# Patient Record
Sex: FEMALE | Race: WHITE | NOT HISPANIC OR LATINO | Employment: OTHER | URBAN - METROPOLITAN AREA
[De-identification: names, ages, dates, MRNs, and addresses within clinical notes are randomized per-mention and may not be internally consistent; named-entity substitution may affect disease eponyms.]

---

## 2023-10-26 ENCOUNTER — MEDICAL CORRESPONDENCE (OUTPATIENT)
Dept: HEALTH INFORMATION MANAGEMENT | Facility: CLINIC | Age: 88
End: 2023-10-26

## 2023-12-12 ENCOUNTER — TRANSFERRED RECORDS (OUTPATIENT)
Dept: HEALTH INFORMATION MANAGEMENT | Facility: CLINIC | Age: 88
End: 2023-12-12
Payer: MEDICARE

## 2023-12-21 ENCOUNTER — HOSPITAL ENCOUNTER (OUTPATIENT)
Dept: CT IMAGING | Facility: CLINIC | Age: 88
Discharge: HOME | End: 2023-12-21
Attending: PHYSICIAN ASSISTANT | Admitting: PHYSICIAN ASSISTANT
Payer: MEDICARE

## 2023-12-21 ENCOUNTER — OFFICE VISIT (OUTPATIENT)
Dept: FAMILY MEDICINE | Facility: CLINIC | Age: 88
End: 2023-12-21
Payer: MEDICARE

## 2023-12-21 VITALS
OXYGEN SATURATION: 98 % | BODY MASS INDEX: 18.99 KG/M2 | WEIGHT: 114 LBS | SYSTOLIC BLOOD PRESSURE: 118 MMHG | RESPIRATION RATE: 16 BRPM | TEMPERATURE: 98.4 F | HEART RATE: 60 BPM | DIASTOLIC BLOOD PRESSURE: 62 MMHG | HEIGHT: 65 IN

## 2023-12-21 DIAGNOSIS — D64.9 LOW HEMOGLOBIN: ICD-10-CM

## 2023-12-21 DIAGNOSIS — K92.1 BLACK STOOLS: ICD-10-CM

## 2023-12-21 DIAGNOSIS — R19.7 DIARRHEA, UNSPECIFIED TYPE: ICD-10-CM

## 2023-12-21 DIAGNOSIS — R30.0 DYSURIA: Primary | ICD-10-CM

## 2023-12-21 LAB
ALBUMIN SERPL BCG-MCNC: 3.7 G/DL (ref 3.5–5.2)
ALP SERPL-CCNC: 123 U/L (ref 40–150)
ALT SERPL W P-5'-P-CCNC: 7 U/L (ref 0–50)
ANION GAP SERPL CALCULATED.3IONS-SCNC: 8 MMOL/L (ref 7–15)
AST SERPL W P-5'-P-CCNC: 19 U/L (ref 0–45)
BILIRUB SERPL-MCNC: 0.4 MG/DL
BUN SERPL-MCNC: 14.7 MG/DL (ref 8–23)
CALCIUM SERPL-MCNC: 10 MG/DL (ref 8.8–10.2)
CHLORIDE SERPL-SCNC: 102 MMOL/L (ref 98–107)
CREAT SERPL-MCNC: 0.69 MG/DL (ref 0.51–0.95)
DEPRECATED HCO3 PLAS-SCNC: 28 MMOL/L (ref 22–29)
EGFRCR SERPLBLD CKD-EPI 2021: 83 ML/MIN/1.73M2
ERYTHROCYTE [DISTWIDTH] IN BLOOD BY AUTOMATED COUNT: 16.1 % (ref 10–15)
GLUCOSE SERPL-MCNC: 87 MG/DL (ref 70–99)
HCT VFR BLD AUTO: 33.9 % (ref 35–47)
HGB BLD-MCNC: 10.6 G/DL (ref 11.7–15.7)
MCH RBC QN AUTO: 28.3 PG (ref 26.5–33)
MCHC RBC AUTO-ENTMCNC: 31.3 G/DL (ref 31.5–36.5)
MCV RBC AUTO: 90 FL (ref 78–100)
PLATELET # BLD AUTO: 379 10E3/UL (ref 150–450)
POTASSIUM SERPL-SCNC: 4.4 MMOL/L (ref 3.4–5.3)
PROT SERPL-MCNC: 6.9 G/DL (ref 6.4–8.3)
RBC # BLD AUTO: 3.75 10E6/UL (ref 3.8–5.2)
SODIUM SERPL-SCNC: 138 MMOL/L (ref 135–145)
WBC # BLD AUTO: 6.7 10E3/UL (ref 4–11)

## 2023-12-21 PROCEDURE — 80053 COMPREHEN METABOLIC PANEL: CPT | Performed by: PHYSICIAN ASSISTANT

## 2023-12-21 PROCEDURE — 85027 COMPLETE CBC AUTOMATED: CPT | Performed by: PHYSICIAN ASSISTANT

## 2023-12-21 PROCEDURE — 36415 COLL VENOUS BLD VENIPUNCTURE: CPT | Performed by: PHYSICIAN ASSISTANT

## 2023-12-21 PROCEDURE — G1010 CDSM STANSON: HCPCS

## 2023-12-21 PROCEDURE — 99204 OFFICE O/P NEW MOD 45 MIN: CPT | Performed by: PHYSICIAN ASSISTANT

## 2023-12-21 RX ORDER — ACETAMINOPHEN 325 MG/1
650 TABLET ORAL 2 TIMES DAILY
COMMUNITY

## 2023-12-21 RX ORDER — FERROUS SULFATE 325(65) MG
325 TABLET, DELAYED RELEASE (ENTERIC COATED) ORAL 2 TIMES DAILY
Status: ON HOLD | COMMUNITY
End: 2024-06-04

## 2023-12-21 RX ORDER — METOPROLOL TARTRATE 25 MG/1
25 TABLET, FILM COATED ORAL 2 TIMES DAILY
COMMUNITY

## 2023-12-21 RX ORDER — ERGOCALCIFEROL 1.25 MG/1
50000 CAPSULE ORAL WEEKLY
COMMUNITY

## 2023-12-21 RX ORDER — LIDOCAINE 4 G/G
1 PATCH TOPICAL EVERY 24 HOURS
COMMUNITY

## 2023-12-21 RX ORDER — POLYETHYLENE GLYCOL 3350 17 G/17G
1 POWDER, FOR SOLUTION ORAL DAILY PRN
COMMUNITY

## 2023-12-21 RX ORDER — DONEPEZIL HYDROCHLORIDE 5 MG/1
5 TABLET, ORALLY DISINTEGRATING ORAL DAILY
COMMUNITY
End: 2024-05-31

## 2023-12-21 RX ORDER — DIPHENHYDRAMINE HYDROCHLORIDE, ZINC ACETATE 2; .1 G/100G; G/100G
CREAM TOPICAL 2 TIMES DAILY PRN
COMMUNITY

## 2023-12-21 RX ORDER — GABAPENTIN 100 MG/1
200 CAPSULE ORAL 3 TIMES DAILY
COMMUNITY

## 2023-12-21 ASSESSMENT — PAIN SCALES - GENERAL: PAINLEVEL: NO PAIN (0)

## 2023-12-21 NOTE — PROGRESS NOTES
Assessment & Plan     Addend: Ct is within normal limits  Advised stool study and celiac labs. Will contact patient with results when known and determine plan  May refer to Gastro        Advised to get CT of abdomin today.  Will contact patient with results when known and determine plan    Dysuria  Unable to collect  - UA Macroscopic with reflex to Microscopic and Culture - Lab Collect    Black stools  HGB is low at 10.6 but unclear if this is her normal.  She is on iron supplementation.   - Fecal colorectal cancer screen (FIT)  - CBC with platelets  - Comprehensive metabolic panel  - CT Abdomen Pelvis w/o Contrast; Future    Low hemoglobin    - CT Abdomen Pelvis w/o Contrast; Future    [id number]}         Ramona Ann Aaseby-Aguilera, PA-C [facility]    Alma Browning is a 88 year old, presenting for the following health issues:  Black Stool (Black, runny, explosive diarrhea x 4 months, getting worse. Hx of diverticulitis about 10 yrs ago. )    Limited history with no medical records and patient not a good historian       Here with Son.  Lives in a nursing home by [facility]  and is supposed to be followed by their medical staff but she has not seen a provider.    Son states she has had explosive black stool for months now.  Nallely states she feels  fine, no fevers, abdominal pain, sob, chest pain.  States she is urinating fine with no pain.     She does take iron supplements and had a dvt at one time so is on Xarelto.           12/21/2023    10:44 AM   Additional Questions   Roomed by Cindi Sánchez CMA   Accompanied by Son- Cody       History of Present Illness       Reason for visit:  Ffffg  Symptom onset:  More than a month  Symptoms include:  Black explosive runny stool  Symptom intensity:  Severe  Symptom progression:  Worsening  Had these symptoms before:  No  What makes it worse:  No  What makes it better:  No    She eats 2-3 servings of fruits and vegetables daily.She consumes 1  "sweetened beverage(s) daily.She exercises with enough effort to increase her heart rate 9 or less minutes per day.  She exercises with enough effort to increase her heart rate 3 or less days per week.   She is taking medications regularly.       See CC          Review of Systems   Constitutional, HEENT, cardiovascular, pulmonary, gi and gu systems are negative, except as otherwise noted.      Objective    /62 (BP Location: Right arm, Patient Position: Sitting, Cuff Size: Adult Regular)   Pulse 60   Temp 98.4  F (36.9  C) (Oral)   Resp 16   Ht 1.651 m (5' 5\")   Wt 51.7 kg (114 lb)   SpO2 98%   BMI 18.97 kg/m    Body mass index is 18.97 kg/m .  Physical Exam   GENERAL: healthy, alert and no distress  HENT: ear canals and TM's normal, nose and mouth without ulcers or lesions  RESP: lungs clear to auscultation - no rales, rhonchi or wheezes  CV: regular rate and rhythm, normal S1 S2, no S3 or S4, no murmur, click or rub, no peripheral edema and peripheral pulses strong  ABDOMEN: soft, nontender, no hepatosplenomegaly, no masses and bowel sounds normal  MS: no gross musculoskeletal defects noted, no edema                      "

## 2023-12-27 NOTE — PROGRESS NOTES
Tried calling becka Ross, message states call cannot be completed at this time, try your call again later  Judy Alonso,

## 2024-01-09 ENCOUNTER — TRANSCRIBE ORDERS (OUTPATIENT)
Dept: OTHER | Age: 89
End: 2024-01-09

## 2024-01-09 DIAGNOSIS — R13.19 OTHER DYSPHAGIA: Primary | ICD-10-CM

## 2024-01-20 ENCOUNTER — HEALTH MAINTENANCE LETTER (OUTPATIENT)
Age: 89
End: 2024-01-20

## 2024-05-28 ENCOUNTER — TRANSFERRED RECORDS (OUTPATIENT)
Dept: HEALTH INFORMATION MANAGEMENT | Facility: CLINIC | Age: 89
End: 2024-05-28
Payer: MEDICARE

## 2024-05-31 ENCOUNTER — HOSPITAL ENCOUNTER (INPATIENT)
Facility: CLINIC | Age: 89
LOS: 4 days | Discharge: HOME HEALTH | DRG: 812 | End: 2024-06-05
Attending: SOCIAL WORKER | Admitting: HOSPITALIST
Payer: MEDICARE

## 2024-05-31 ENCOUNTER — APPOINTMENT (OUTPATIENT)
Dept: CARDIOLOGY | Facility: CLINIC | Age: 89
DRG: 812 | End: 2024-05-31
Attending: PHYSICIAN ASSISTANT
Payer: MEDICARE

## 2024-05-31 ENCOUNTER — APPOINTMENT (OUTPATIENT)
Dept: CT IMAGING | Facility: CLINIC | Age: 89
DRG: 812 | End: 2024-05-31
Attending: PHYSICIAN ASSISTANT
Payer: MEDICARE

## 2024-05-31 DIAGNOSIS — N30.00 ACUTE CYSTITIS WITHOUT HEMATURIA: ICD-10-CM

## 2024-05-31 DIAGNOSIS — D64.9 ANEMIA, UNSPECIFIED TYPE: ICD-10-CM

## 2024-05-31 DIAGNOSIS — K92.1 BLACK STOOL: ICD-10-CM

## 2024-05-31 DIAGNOSIS — R19.7 DIARRHEA, UNSPECIFIED TYPE: ICD-10-CM

## 2024-05-31 DIAGNOSIS — M62.81 GENERALIZED MUSCLE WEAKNESS: Primary | ICD-10-CM

## 2024-05-31 LAB
ABO/RH(D): NORMAL
ADV 40+41 DNA STL QL NAA+NON-PROBE: NEGATIVE
ALBUMIN SERPL BCG-MCNC: 3.7 G/DL (ref 3.5–5.2)
ALP SERPL-CCNC: 128 U/L (ref 40–150)
ALT SERPL W P-5'-P-CCNC: <5 U/L (ref 0–50)
ANION GAP SERPL CALCULATED.3IONS-SCNC: 8 MMOL/L (ref 7–15)
ANTIBODY SCREEN: NEGATIVE
AST SERPL W P-5'-P-CCNC: 27 U/L (ref 0–45)
ASTRO TYP 1-8 RNA STL QL NAA+NON-PROBE: NEGATIVE
BASOPHILS # BLD AUTO: 0 10E3/UL (ref 0–0.2)
BASOPHILS # BLD AUTO: 0.1 10E3/UL (ref 0–0.2)
BASOPHILS NFR BLD AUTO: 1 %
BASOPHILS NFR BLD AUTO: 1 %
BILIRUB SERPL-MCNC: 0.6 MG/DL
BUN SERPL-MCNC: 16.8 MG/DL (ref 8–23)
C CAYETANENSIS DNA STL QL NAA+NON-PROBE: NEGATIVE
CALCIUM SERPL-MCNC: 9.3 MG/DL (ref 8.8–10.2)
CAMPYLOBACTER DNA SPEC NAA+PROBE: NEGATIVE
CHLORIDE SERPL-SCNC: 101 MMOL/L (ref 98–107)
CREAT SERPL-MCNC: 0.73 MG/DL (ref 0.51–0.95)
CRP SERPL-MCNC: 40.91 MG/L
CRYPTOSP DNA STL QL NAA+NON-PROBE: NEGATIVE
DEPRECATED HCO3 PLAS-SCNC: 28 MMOL/L (ref 22–29)
E COLI O157 DNA STL QL NAA+NON-PROBE: NORMAL
E HISTOLYT DNA STL QL NAA+NON-PROBE: NEGATIVE
EAEC ASTA GENE ISLT QL NAA+PROBE: NEGATIVE
EC STX1+STX2 GENES STL QL NAA+NON-PROBE: NEGATIVE
EGFRCR SERPLBLD CKD-EPI 2021: 78 ML/MIN/1.73M2
EOSINOPHIL # BLD AUTO: 0.3 10E3/UL (ref 0–0.7)
EOSINOPHIL # BLD AUTO: 0.3 10E3/UL (ref 0–0.7)
EOSINOPHIL NFR BLD AUTO: 3 %
EOSINOPHIL NFR BLD AUTO: 3 %
EPEC EAE GENE STL QL NAA+NON-PROBE: NEGATIVE
ERYTHROCYTE [DISTWIDTH] IN BLOOD BY AUTOMATED COUNT: 15.4 % (ref 10–15)
ERYTHROCYTE [DISTWIDTH] IN BLOOD BY AUTOMATED COUNT: 15.5 % (ref 10–15)
ERYTHROCYTE [SEDIMENTATION RATE] IN BLOOD BY WESTERGREN METHOD: 51 MM/HR (ref 0–30)
ETEC LTA+ST1A+ST1B TOX ST NAA+NON-PROBE: NEGATIVE
FOLATE SERPL-MCNC: 6.1 NG/ML (ref 4.6–34.8)
G LAMBLIA DNA STL QL NAA+NON-PROBE: NEGATIVE
GLUCOSE SERPL-MCNC: 91 MG/DL (ref 70–99)
HCT VFR BLD AUTO: 24.9 % (ref 35–47)
HCT VFR BLD AUTO: 26.5 % (ref 35–47)
HEMOCCULT STL QL: NEGATIVE
HGB BLD-MCNC: 7.6 G/DL (ref 11.7–15.7)
HGB BLD-MCNC: 7.6 G/DL (ref 11.7–15.7)
HGB BLD-MCNC: 8.1 G/DL (ref 11.7–15.7)
HOLD SPECIMEN: NORMAL
HOLD SPECIMEN: NORMAL
IMM GRANULOCYTES # BLD: 0.1 10E3/UL
IMM GRANULOCYTES # BLD: 0.1 10E3/UL
IMM GRANULOCYTES NFR BLD: 1 %
IMM GRANULOCYTES NFR BLD: 1 %
IRON BINDING CAPACITY (ROCHE): 278 UG/DL (ref 240–430)
IRON SATN MFR SERPL: 17 % (ref 15–46)
IRON SERPL-MCNC: 47 UG/DL (ref 37–145)
LDH SERPL L TO P-CCNC: 495 U/L (ref 0–250)
LVEF ECHO: NORMAL
LYMPHOCYTES # BLD AUTO: 1.4 10E3/UL (ref 0.8–5.3)
LYMPHOCYTES # BLD AUTO: 1.5 10E3/UL (ref 0.8–5.3)
LYMPHOCYTES NFR BLD AUTO: 17 %
LYMPHOCYTES NFR BLD AUTO: 17 %
MCH RBC QN AUTO: 30 PG (ref 26.5–33)
MCH RBC QN AUTO: 30.2 PG (ref 26.5–33)
MCHC RBC AUTO-ENTMCNC: 30.5 G/DL (ref 31.5–36.5)
MCHC RBC AUTO-ENTMCNC: 30.6 G/DL (ref 31.5–36.5)
MCV RBC AUTO: 98 FL (ref 78–100)
MCV RBC AUTO: 99 FL (ref 78–100)
MONOCYTES # BLD AUTO: 0.8 10E3/UL (ref 0–1.3)
MONOCYTES # BLD AUTO: 0.9 10E3/UL (ref 0–1.3)
MONOCYTES NFR BLD AUTO: 11 %
MONOCYTES NFR BLD AUTO: 9 %
NEUTROPHILS # BLD AUTO: 5.8 10E3/UL (ref 1.6–8.3)
NEUTROPHILS # BLD AUTO: 5.8 10E3/UL (ref 1.6–8.3)
NEUTROPHILS NFR BLD AUTO: 67 %
NEUTROPHILS NFR BLD AUTO: 69 %
NOROVIRUS GI+II RNA STL QL NAA+NON-PROBE: NEGATIVE
NRBC # BLD AUTO: 0 10E3/UL
NRBC # BLD AUTO: 0 10E3/UL
NRBC BLD AUTO-RTO: 0 /100
NRBC BLD AUTO-RTO: 0 /100
P SHIGELLOIDES DNA STL QL NAA+NON-PROBE: NEGATIVE
PLATELET # BLD AUTO: 747 10E3/UL (ref 150–450)
PLATELET # BLD AUTO: 786 10E3/UL (ref 150–450)
POTASSIUM SERPL-SCNC: 4.4 MMOL/L (ref 3.4–5.3)
PROT SERPL-MCNC: 6.8 G/DL (ref 6.4–8.3)
RBC # BLD AUTO: 2.53 10E6/UL (ref 3.8–5.2)
RBC # BLD AUTO: 2.68 10E6/UL (ref 3.8–5.2)
RETICS # AUTO: 0.11 10E6/UL (ref 0.03–0.1)
RETICS # AUTO: 0.14 10E6/UL (ref 0.03–0.1)
RETICS/RBC NFR AUTO: 4.5 % (ref 0.5–2)
RETICS/RBC NFR AUTO: 5.2 % (ref 0.5–2)
RVA RNA STL QL NAA+NON-PROBE: NEGATIVE
SALMONELLA SP RPOD STL QL NAA+PROBE: NEGATIVE
SAPO I+II+IV+V RNA STL QL NAA+NON-PROBE: NEGATIVE
SHIGELLA SP+EIEC IPAH ST NAA+NON-PROBE: NEGATIVE
SODIUM SERPL-SCNC: 137 MMOL/L (ref 135–145)
SPECIMEN EXPIRATION DATE: NORMAL
TSH SERPL DL<=0.005 MIU/L-ACNC: 1.91 UIU/ML (ref 0.3–4.2)
V CHOLERAE DNA SPEC QL NAA+PROBE: NEGATIVE
VIBRIO DNA SPEC NAA+PROBE: NEGATIVE
WBC # BLD AUTO: 8.4 10E3/UL (ref 4–11)
WBC # BLD AUTO: 8.5 10E3/UL (ref 4–11)
Y ENTEROCOL DNA STL QL NAA+PROBE: NEGATIVE

## 2024-05-31 PROCEDURE — 82272 OCCULT BLD FECES 1-3 TESTS: CPT | Performed by: SOCIAL WORKER

## 2024-05-31 PROCEDURE — G0378 HOSPITAL OBSERVATION PER HR: HCPCS

## 2024-05-31 PROCEDURE — 70450 CT HEAD/BRAIN W/O DYE: CPT | Mod: MG

## 2024-05-31 PROCEDURE — 36415 COLL VENOUS BLD VENIPUNCTURE: CPT | Performed by: SOCIAL WORKER

## 2024-05-31 PROCEDURE — 85652 RBC SED RATE AUTOMATED: CPT | Performed by: INTERNAL MEDICINE

## 2024-05-31 PROCEDURE — 36415 COLL VENOUS BLD VENIPUNCTURE: CPT | Performed by: PHYSICIAN ASSISTANT

## 2024-05-31 PROCEDURE — 83615 LACTATE (LD) (LDH) ENZYME: CPT | Performed by: PHYSICIAN ASSISTANT

## 2024-05-31 PROCEDURE — 84238 ASSAY NONENDOCRINE RECEPTOR: CPT | Performed by: INTERNAL MEDICINE

## 2024-05-31 PROCEDURE — 85041 AUTOMATED RBC COUNT: CPT | Performed by: PHYSICIAN ASSISTANT

## 2024-05-31 PROCEDURE — 83550 IRON BINDING TEST: CPT | Performed by: PHYSICIAN ASSISTANT

## 2024-05-31 PROCEDURE — 85025 COMPLETE CBC W/AUTO DIFF WBC: CPT | Performed by: SOCIAL WORKER

## 2024-05-31 PROCEDURE — 83010 ASSAY OF HAPTOGLOBIN QUANT: CPT | Performed by: PHYSICIAN ASSISTANT

## 2024-05-31 PROCEDURE — 93306 TTE W/DOPPLER COMPLETE: CPT | Mod: 26 | Performed by: INTERNAL MEDICINE

## 2024-05-31 PROCEDURE — C8929 TTE W OR WO FOL WCON,DOPPLER: HCPCS

## 2024-05-31 PROCEDURE — 83516 IMMUNOASSAY NONANTIBODY: CPT | Performed by: PHYSICIAN ASSISTANT

## 2024-05-31 PROCEDURE — 86140 C-REACTIVE PROTEIN: CPT | Performed by: PHYSICIAN ASSISTANT

## 2024-05-31 PROCEDURE — 83993 ASSAY FOR CALPROTECTIN FECAL: CPT | Performed by: PHYSICIAN ASSISTANT

## 2024-05-31 PROCEDURE — 86900 BLOOD TYPING SEROLOGIC ABO: CPT | Performed by: SOCIAL WORKER

## 2024-05-31 PROCEDURE — 85045 AUTOMATED RETICULOCYTE COUNT: CPT | Performed by: PHYSICIAN ASSISTANT

## 2024-05-31 PROCEDURE — 99285 EMERGENCY DEPT VISIT HI MDM: CPT | Mod: 25

## 2024-05-31 PROCEDURE — 84443 ASSAY THYROID STIM HORMONE: CPT | Performed by: PHYSICIAN ASSISTANT

## 2024-05-31 PROCEDURE — 82746 ASSAY OF FOLIC ACID SERUM: CPT | Performed by: PHYSICIAN ASSISTANT

## 2024-05-31 PROCEDURE — 255N000002 HC RX 255 OP 636: Performed by: HOSPITALIST

## 2024-05-31 PROCEDURE — 250N000013 HC RX MED GY IP 250 OP 250 PS 637: Performed by: PHYSICIAN ASSISTANT

## 2024-05-31 PROCEDURE — 82728 ASSAY OF FERRITIN: CPT | Performed by: PHYSICIAN ASSISTANT

## 2024-05-31 PROCEDURE — 99223 1ST HOSP IP/OBS HIGH 75: CPT | Mod: AI | Performed by: PHYSICIAN ASSISTANT

## 2024-05-31 PROCEDURE — 80053 COMPREHEN METABOLIC PANEL: CPT | Performed by: SOCIAL WORKER

## 2024-05-31 PROCEDURE — 85018 HEMOGLOBIN: CPT | Performed by: PHYSICIAN ASSISTANT

## 2024-05-31 PROCEDURE — 87507 IADNA-DNA/RNA PROBE TQ 12-25: CPT | Performed by: PHYSICIAN ASSISTANT

## 2024-05-31 PROCEDURE — 82607 VITAMIN B-12: CPT | Performed by: PHYSICIAN ASSISTANT

## 2024-05-31 PROCEDURE — 85060 BLOOD SMEAR INTERPRETATION: CPT | Performed by: PATHOLOGY

## 2024-05-31 PROCEDURE — 36415 COLL VENOUS BLD VENIPUNCTURE: CPT | Performed by: INTERNAL MEDICINE

## 2024-05-31 PROCEDURE — 99222 1ST HOSP IP/OBS MODERATE 55: CPT | Performed by: INTERNAL MEDICINE

## 2024-05-31 RX ORDER — MIRABEGRON 25 MG/1
25 TABLET, FILM COATED, EXTENDED RELEASE ORAL DAILY
Status: DISCONTINUED | OUTPATIENT
Start: 2024-06-01 | End: 2024-06-05 | Stop reason: HOSPADM

## 2024-05-31 RX ORDER — AMOXICILLIN 250 MG
2 CAPSULE ORAL 2 TIMES DAILY PRN
Status: DISCONTINUED | OUTPATIENT
Start: 2024-05-31 | End: 2024-06-05 | Stop reason: HOSPADM

## 2024-05-31 RX ORDER — GABAPENTIN 100 MG/1
200 CAPSULE ORAL 3 TIMES DAILY
Status: DISCONTINUED | OUTPATIENT
Start: 2024-05-31 | End: 2024-06-05 | Stop reason: HOSPADM

## 2024-05-31 RX ORDER — ESCITALOPRAM OXALATE 10 MG/1
10 TABLET ORAL DAILY
COMMUNITY

## 2024-05-31 RX ORDER — LIDOCAINE 4 G/G
1 PATCH TOPICAL EVERY 24 HOURS
Status: DISCONTINUED | OUTPATIENT
Start: 2024-06-01 | End: 2024-06-05 | Stop reason: HOSPADM

## 2024-05-31 RX ORDER — DONEPEZIL HYDROCHLORIDE 5 MG/1
5 TABLET, FILM COATED ORAL DAILY
Status: DISCONTINUED | OUTPATIENT
Start: 2024-06-01 | End: 2024-06-05 | Stop reason: HOSPADM

## 2024-05-31 RX ORDER — AMOXICILLIN 250 MG
1 CAPSULE ORAL 2 TIMES DAILY PRN
Status: DISCONTINUED | OUTPATIENT
Start: 2024-05-31 | End: 2024-06-05 | Stop reason: HOSPADM

## 2024-05-31 RX ORDER — ACETAMINOPHEN 650 MG/1
650 SUPPOSITORY RECTAL EVERY 4 HOURS PRN
Status: CANCELLED | OUTPATIENT
Start: 2024-05-31

## 2024-05-31 RX ORDER — ONDANSETRON 2 MG/ML
4 INJECTION INTRAMUSCULAR; INTRAVENOUS EVERY 6 HOURS PRN
Status: CANCELLED | OUTPATIENT
Start: 2024-05-31

## 2024-05-31 RX ORDER — DONEPEZIL HYDROCHLORIDE 5 MG/1
5 TABLET, FILM COATED ORAL DAILY
COMMUNITY

## 2024-05-31 RX ORDER — ACETAMINOPHEN 325 MG/1
650 TABLET ORAL EVERY 4 HOURS PRN
Status: CANCELLED | OUTPATIENT
Start: 2024-05-31

## 2024-05-31 RX ORDER — ONDANSETRON 4 MG/1
4 TABLET, ORALLY DISINTEGRATING ORAL EVERY 6 HOURS PRN
Status: DISCONTINUED | OUTPATIENT
Start: 2024-05-31 | End: 2024-06-05 | Stop reason: HOSPADM

## 2024-05-31 RX ORDER — ONDANSETRON 2 MG/ML
4 INJECTION INTRAMUSCULAR; INTRAVENOUS EVERY 6 HOURS PRN
Status: DISCONTINUED | OUTPATIENT
Start: 2024-05-31 | End: 2024-06-05 | Stop reason: HOSPADM

## 2024-05-31 RX ORDER — ACETAMINOPHEN 650 MG/1
650 SUPPOSITORY RECTAL EVERY 4 HOURS PRN
Status: DISCONTINUED | OUTPATIENT
Start: 2024-05-31 | End: 2024-06-05 | Stop reason: HOSPADM

## 2024-05-31 RX ORDER — POLYETHYLENE GLYCOL 3350 17 G/17G
17 POWDER, FOR SOLUTION ORAL 2 TIMES DAILY PRN
Status: DISCONTINUED | OUTPATIENT
Start: 2024-05-31 | End: 2024-06-05 | Stop reason: HOSPADM

## 2024-05-31 RX ORDER — MIRABEGRON 25 MG/1
25 TABLET, FILM COATED, EXTENDED RELEASE ORAL DAILY
COMMUNITY

## 2024-05-31 RX ORDER — AMOXICILLIN 250 MG
2 CAPSULE ORAL 2 TIMES DAILY PRN
Status: CANCELLED | OUTPATIENT
Start: 2024-05-31

## 2024-05-31 RX ORDER — AMOXICILLIN 250 MG
1 CAPSULE ORAL 2 TIMES DAILY PRN
Status: CANCELLED | OUTPATIENT
Start: 2024-05-31

## 2024-05-31 RX ORDER — ONDANSETRON 4 MG/1
4 TABLET, ORALLY DISINTEGRATING ORAL EVERY 6 HOURS PRN
Status: CANCELLED | OUTPATIENT
Start: 2024-05-31

## 2024-05-31 RX ORDER — ACETAMINOPHEN 325 MG/1
650 TABLET ORAL EVERY 4 HOURS PRN
Status: DISCONTINUED | OUTPATIENT
Start: 2024-05-31 | End: 2024-06-05 | Stop reason: HOSPADM

## 2024-05-31 RX ORDER — METOPROLOL TARTRATE 25 MG/1
25 TABLET, FILM COATED ORAL 2 TIMES DAILY
Status: DISCONTINUED | OUTPATIENT
Start: 2024-05-31 | End: 2024-06-05 | Stop reason: HOSPADM

## 2024-05-31 RX ORDER — ESCITALOPRAM OXALATE 10 MG/1
10 TABLET ORAL DAILY
Status: DISCONTINUED | OUTPATIENT
Start: 2024-06-01 | End: 2024-06-05 | Stop reason: HOSPADM

## 2024-05-31 RX ORDER — LACTOBACILLUS RHAMNOSUS GG 10B CELL
2 CAPSULE ORAL DAILY
COMMUNITY

## 2024-05-31 RX ADMIN — METOPROLOL TARTRATE 25 MG: 25 TABLET, FILM COATED ORAL at 21:49

## 2024-05-31 RX ADMIN — GABAPENTIN 200 MG: 100 CAPSULE ORAL at 21:48

## 2024-05-31 RX ADMIN — GABAPENTIN 200 MG: 100 CAPSULE ORAL at 16:50

## 2024-05-31 RX ADMIN — HUMAN ALBUMIN MICROSPHERES AND PERFLUTREN 9 ML: 10; .22 INJECTION, SOLUTION INTRAVENOUS at 16:26

## 2024-05-31 ASSESSMENT — ACTIVITIES OF DAILY LIVING (ADL)
ADLS_ACUITY_SCORE: 40
ADLS_ACUITY_SCORE: 35
ADLS_ACUITY_SCORE: 40
ADLS_ACUITY_SCORE: 35
ADLS_ACUITY_SCORE: 35
ADLS_ACUITY_SCORE: 40
ADLS_ACUITY_SCORE: 33
ADLS_ACUITY_SCORE: 40
ADLS_ACUITY_SCORE: 35
ADLS_ACUITY_SCORE: 40
ADLS_ACUITY_SCORE: 35

## 2024-05-31 NOTE — H&P
[facility]    History and Physical - Hospitalist Service       Date of Admission:  5/31/2024    Assessment & Plan   Nallely Manrique is a 89 year old female admitted on 5/31/2024 with a PMH significant for polymyalgia rheumatica, DVT post hip replacement, paroxysmal Afib, HTN, HLD, COPD who presented from [facility] for further evaluation of anemia after having a Hgb 7.1 earlier this week. Note baseline Hgb 8-9 with chronic thrombocytosis dating back to 1/2023. During interview, son actually reports more concern about pt's persistent diarrhea that has been ongoing since 7/2023. Registered under observation status for further evaluation and treatment.     Persistent Diarrhea: Diarrhea since hip replacement in July '23. Prior colonoscopy years ago reported as normal. No known hx of IBD. ? Microscopic colitis? CT A/P 12/2023 negative for acute pathology, extensive colonic diverticulosis, no diverticulitis.   - [place] to observation   - [facility] consulted, appreciate input.    - Enteric panel, calprotectin, ESR    - Monitor and record stool output   - Clear liquid diet until GI game plan established     Acute on chronic borderline macrocytic anemia  Chronic Thrombocytosis  Chronic melena, but suspect related to iron supplementation: No evidence of active GI bleed, vaginal bleed or hematuria. No hx of PUD, not GERD. On Xarelto with last dose the evening prior. Takes iron supplementation. No abdominal pain, change in appetite. Not on special diet. Prior colonoscopy years ago reported as normal. No known hematologic disorder  *Hgb since admission 8.1 --> 7.6 was down to 7.1 earlier in the week; baseline Hgb dating back to 1/2023 is in 8-9 range  *Platelets since admission 786 --> 747; baseline platelets back to 1/2023 have been 500-900 range  *Iron studies and TSH normal. hemoccult negative  - Peripheral smear, Folate and B12 pending  - [facility] consulted, appreciate input    -  [facility] consultation, appreciate input   - Conditional orders for transfusion if Hgb <7  - Hold Xarelto until consultants weigh in     Mild acute encephalopathy  Cognitive impairment   *Son reports cognitive decline since surgery in July '23, but more acute memory decline over the course of the past week  -Head CT  -Continue PRA Aricept    Generalized Weakness: Reports recent fatigue. Wheelchair bound, transfers herself to the toilet.   - PT consulted   -  for discharge planning     BASELINE PRIOR TO ADMISSION  1.  Living Situation:  [facility]  2. Ambulation:  wheelchair  3.  Mental status:  alert and oriented X4  4. Diet: regular   5.  Family contact:  Extended Emergency Contact Information  Primary Emergency Contact: Cody Manrique  Mobile Phone: [phone]  Relation: Son     Depression: Continue PTA Lexapro    Paroxsymal Afib  Hx of DVT post surgery  *Recent LE US negative   -Holding Xarelto as above  -Continue PTA Metoprolol    PMR: No acute issues. Baseline chronic hip pain. Not on steroids.     Cardiac murmur: Noted systolic murmur in the aortic region. Pt reports she has had this for years. No recent cardiac sx.   - Echo ordered given fatigue         Diet:  Clear liquid diet   DVT Prophylaxis: Pneumatic Compression Devices  Arguelles Catheter: Not present  Lines: None     Cardiac Monitoring: None  Code Status:  Full Code    Clinically Significant Risk Factors Present on Admission               # Drug Induced Coagulation Defect: home medication list includes an anticoagulant medication                    Disposition Plan   Medically Ready for Discharge: Anticipated in 2-4 Days     The patient's care was discussed with the Attending Physician, Dr. Micah MD, Patient, Patient's Family, and Tonia Shin PA-C .    Physician Attestation   I,Tonia Shin PA-C, was present with the medical/IDA student who participated in the service and in the documentation of the note.  I have verified the history and  personally performed the physical exam and medical decision making.  I agree with the assessment and plan of care as documented in the note.      Tonia ASCENCOI Student  Hospitalist Service  [facility]  Securely message with [email or web address] (more info)  Text page via [facility] Paging/Directory     ______________________________________________________________________    Chief Complaint   Anemia, Dark Stool, Generalized weakness, Acute cognitive shift from baseline    History is obtained from the patient, electronic health record, emergency department physician, and patient's son    History of Present Illness   Nallely Manrique is a 89 year old female who presents to the ED today, 5/31, with concerns over recent lab results showing anemia, thrombocytosis, and borderline macrocytosis.  She is largely asymptomatic aside from fatigue.  Son is at bedside.  Additionally, she has been experiencing dark, watery stools since July.  In July she had a total hip replacement.  She did have a DVT during that hospital stay that she is now on DOAC prophylaxis for.  Also note hx of a fib (pt and son do not recall that). She has no recent trauma, falls, or illnesses.  She has had no recent med changes, bleeding, or change in diet.  She does endorse that she has been feeling weak, but it appears this is the patient's baseline, not a new symptom.  Approximately 1 week ago she states that she had right leg pain that spontaneously resolved. She had an US for further evaluation that was negative.      Her son endorses that she seems to have declined somewhat from her cognitive baseline fairly acutely over the past week.  She is Aox4, but does appear to have some mild trouble with recall. Overall, however, a good historian with a pleasant affect.    She has a history of diverticulosis, but currently has no fever or abdominal discomfort.  She and her son endorse that her appetite is unchanged, and that  she eats well at her [employer].  No recent abdominal pain. No infectious sx.     Seen by Dr. Hopkins in the ED. Work-up as above.     Past Medical History    Afib   DVT   Cognitive impairment   Hypertension   Hyperlipidemia   PMR    Past Surgical History   Femur repair   Mastectomy     Prior to Admission Medications   Prior to Admission Medications   Prescriptions Last Dose Informant Patient Reported? Taking?   Lidocaine (LIDOCARE) 4 % Patch   Yes No   Sig: Place 1 patch onto the skin every 24 hours To prevent lidocaine toxicity, patient should be patch free for 12 hrs daily.Remove in PM.   acetaminophen (TYLENOL) 325 MG tablet   Yes No   Sig: Take 325-650 mg by mouth 2 times daily   calcium citrate-vitamin D (CITRACAL) 200-6.25 MG-MCG TABS per tablet   Yes No   Sig: Take 2 tablets by mouth 2 times daily   dimethicone-zinc oxide (JUAN PROTECT) external cream   Yes No   Sig: Apply topically 4 times daily as needed for dry skin or other Apply to affected areas topically as needed for masd after cleansing   diphenhydrAMINE-zinc acetate (BENADRYL) 2-0.1 % external cream   Yes No   Sig: Apply topically 2 times daily as needed for itching   donepezil (ARICEPT) 5 MG ODT   Yes No   Sig: Take 5 mg by mouth daily   ergocalciferol (ERGOCALCIFEROL) 1.25 MG (30635 UT) capsule   Yes No   Sig: Take 50,000 Units by mouth once a week On Wednesdays   ferrous sulfate (FE TABS) 325 (65 Fe) MG EC tablet   Yes No   Sig: Take 325 mg by mouth 2 times daily   gabapentin (NEURONTIN) 100 MG capsule   Yes No   Sig: Take 2 capsules by mouth 3 times daily   metoprolol tartrate (LOPRESSOR) 25 MG tablet   Yes No   Sig: Take 25 mg by mouth 2 times daily   mineral oil-white petrolatum (EUCERIN/MINERIN) cream   Yes No   Sig: Apply topically as needed for dry skin   polyethylene glycol (MIRALAX) 17 g packet   Yes No   Sig: Take 1 packet by mouth daily 1 capful every 24 hrs.   rivaroxaban ANTICOAGULANT (XARELTO) 20 MG TABS tablet   Yes No   Sig: Take 20 mg by  mouth daily (with dinner)      Facility-Administered Medications: None           Physical Exam   Vital Signs: Temp: 98.3  F (36.8  C)   BP: 126/56 Pulse: 56   Resp: 18 SpO2: 97 % O2 Device: None (Room air)    Weight: 118 lbs 0 oz    Physical Exam    General: Awake, alert, sitting up in bed. Looks comfortable. No acute distress.  HEENT: Normocephalic, atraumatic. Extraocular movements intact. Pupils equal round and reactive to light bilaterally. Oropharynx clear without exudates.   Respiratory: Clear to auscultation bilaterally, no rales, wheezing, or rhonchi.  Cardiovascular: Regular rate and rhythm, 2+ systolic murmur auscultated. No peripheral edema.   Gastrointestinal: Soft, non-tender, non-distended. Bowel sounds present.  Skin: Warm, dry. No obvious rashes or lesions on exposed skin. Dorsalis pedis pulses palpable bilaterally.  Musculoskeletal: No joint swelling, erythema or tenderness. Moves all extremities equally.  Neurologic: AAO x4. Cranial nerves 2-12 grossly intact, normal strength and sensation.  Psychiatric: Appropriate mood and affect. No obvious anxiety or depression.      Medical Decision Making       75 MINUTES SPENT BY ME on the date of service doing chart review, history, exam, documentation & further activities per the note.      Data   ------------------------- PAST 24 HR DATA REVIEWED -----------------------------------------------

## 2024-05-31 NOTE — PROGRESS NOTES
Observation goals  PRIOR TO DISCHARGE        Comments: -vital signs normal or at patient baseline=Met  -GI consult= Met  -Hematology consult=Met  -Anemia work-up completed=Not Met  -Stool studies=Partially met, pending    Nurse to notify provider when observation goals have been met and patient is ready for discharge.

## 2024-05-31 NOTE — ED NOTES
[facility]  ED Nurse Handoff Report    ED Chief complaint: Abnormal Labs      ED Diagnosis:   Final diagnoses:   None       Code Status: not addressed at this time    Allergies: No Known Allergies    Patient Story: Pt presents to ed to be evaluated for low hemoglobin.   Pt has a hx of diverticulitis, and is on blood thinners. Pt lives at [facility] and recently had her hemoglobin checked, and it was 7.1.   Pt denies any pain anywhere.     Focused Assessment:  A&Ox4, denies shortness of breath or pain. Pleasant and cooperative. Pale. Reported weakness.    Treatments and/or interventions provided: labs    Patient's response to treatments and/or interventions: unchanged    Labs Ordered and Resulted from Time of ED Arrival to Time of ED Departure   CBC WITH PLATELETS AND DIFFERENTIAL - Abnormal       Result Value    WBC Count 8.5      RBC Count 2.68 (*)     Hemoglobin 8.1 (*)     Hematocrit 26.5 (*)     MCV 99      MCH 30.2      MCHC 30.6 (*)     RDW 15.5 (*)     Platelet Count 786 (*)     % Neutrophils 67      % Lymphocytes 17      % Monocytes 11      % Eosinophils 3      % Basophils 1      % Immature Granulocytes 1      NRBCs per 100 WBC 0      Absolute Neutrophils 5.8      Absolute Lymphocytes 1.5      Absolute Monocytes 0.9      Absolute Eosinophils 0.3      Absolute Basophils 0.0      Absolute Immature Granulocytes 0.1      Absolute NRBCs 0.0     COMPREHENSIVE METABOLIC PANEL - Normal    Sodium 137      Potassium 4.4      Carbon Dioxide (CO2) 28      Anion Gap 8      Urea Nitrogen 16.8      Creatinine 0.73      GFR Estimate 78      Calcium 9.3      Chloride 101      Glucose 91      Alkaline Phosphatase 128      AST 27      ALT <5      Protein Total 6.8      Albumin 3.7      Bilirubin Total 0.6     OCCULT BLOOD STOOL - Normal    Occult Blood Negative     TYPE AND SCREEN, ADULT    ABO/RH(D) O NEG      Antibody Screen Negative      SPECIMEN EXPIRATION DATE [id number]     ABO/RH TYPE AND SCREEN          To be done/followed up on inpatient unit:  follow admitting MD orders    Does this patient have any cognitive concerns?:  none    Activity level - Baseline/Home:  Wheelchair-bound. Pt is able to stand and pivot.  Activity Level - Current:   Unknown    Patient's Preferred language: English   Needed?: No    Isolation: None  Infection: Not Applicable  Patient tested for COVID 19 prior to admission: NO  Bariatric?: No    Vital Signs:   Vitals:    05/31/24 0950 05/31/24 1045 05/31/24 1100   BP: 138/55 126/47 120/52   BP Location: Right arm     Pulse: 58 54 58   Resp: 18 20    Temp: 98.3  F (36.8  C)     SpO2: 97% 97% 97%   Weight: 53.5 kg (118 lb)         Cardiac Rhythm:Cardiac Rhythm: Sinus bradycardia    Was the PSS-3 completed:   Yes  What interventions are required if any?               Family Comments: son supportive at bedside    For the majority of the shift this patient's behavior was Green.     ED NURSE PHONE NUMBER:  ([phone])

## 2024-05-31 NOTE — PLAN OF CARE
Patient arrived on unit at change of shift.  Patient settled in bed.  Vitals taken and stable; on RA.  Denies pain.  A/O x4.  Patient's son is at bedside.  Both patient and her son updated with plan of care. Change of shift report provided to Deanne Carrero RN.

## 2024-05-31 NOTE — ED TRIAGE NOTES
Pt presents to ed to be evaluated for low hemoglobin.   Pt has a hx of diverticulitis, and is on blood thinners. Pt lives at [facility] and recently had her hemoglobin checked, and it was 7.1.   Pt denies any pain anywhere.    Triage Assessment (Adult)       Row Name 05/31/24 0956          Triage Assessment    Airway WDL WDL        Respiratory WDL    Respiratory WDL WDL        Chest Pain Assessment    Associated Signs/Symptoms pallor

## 2024-05-31 NOTE — PHARMACY-ADMISSION MEDICATION HISTORY
Pharmacist Admission Medication History    Admission medication history is complete. The information provided in this note is only as accurate as the sources available at the time of the update.    Information Source(s): Facility ([facility]/) medication list/MAR and CareEverywhere/SureScripts via phone  Bret Kemp [phone]    Pertinent Information: none    Changes made to PTA medication list:  Added: Culterelle, escitalopram, mirabegron  Deleted: None  Changed: None    Allergies reviewed with paperwork from facility and updates made in EHR: yes    Medication History Completed By: Charla Vyas [facility] 5/31/2024 12:12 PM    PTA Med List   Medication Sig Last Dose    acetaminophen (TYLENOL) 325 MG tablet Take 650 mg by mouth 2 times daily 5/31/2024 at 8am    calcium citrate-vitamin D (CITRACAL) 200-6.25 MG-MCG TABS per tablet Take 2 tablets by mouth 2 times daily 5/31/2024 at 8am    dimethicone-zinc oxide (JUAN PROTECT) external cream Apply topically 2 times daily Affected area rash 5/31/2024 at 8am    dimethicone-zinc oxide (JUAN PROTECT) external cream Apply topically 4 times daily as needed for dry skin or other Apply to affected areas topically as needed for masd after cleansing     diphenhydrAMINE-zinc acetate (BENADRYL) 2-0.1 % external cream Apply topically 2 times daily as needed for itching     donepezil (ARICEPT) 5 MG tablet Take 5 mg by mouth daily 5/31/2024 at 8am    ergocalciferol (ERGOCALCIFEROL) 1.25 MG (22051 UT) capsule Take 50,000 Units by mouth once a week On Wednesdays 5/29/2024    escitalopram (LEXAPRO) 10 MG tablet Take 10 mg by mouth daily 5/31/2024 at 8am    ferrous sulfate (FE TABS) 325 (65 Fe) MG EC tablet Take 325 mg by mouth 2 times daily 5/31/2024 at 8am    gabapentin (NEURONTIN) 100 MG capsule Take 200 mg by mouth 3 times daily 5/31/2024 at 8am    lactobacillus rhamnosus, GG, (CULTURELL) capsule Take 2 capsules by mouth daily 5/31/2024 at 8am    Lidocaine (LIDOCARE) 4 % Patch  Place 1 patch onto the skin every 24 hours To prevent lidocaine toxicity, patient should be patch free for 12 hrs daily.Remove in PM. 5/31/2024 at 8am    metoprolol tartrate (LOPRESSOR) 25 MG tablet Take 25 mg by mouth 2 times daily 5/31/2024 at 8am    mineral oil-white petrolatum (EUCERIN/MINERIN) cream Apply topically 2 times daily Apply to bilateral forearms 5/31/2024 at 8am    mirabegron (MYRBETRIQ) 25 MG 24 hr tablet Take 25 mg by mouth daily 5/31/2024 at 8am    polyethylene glycol (MIRALAX) 17 g packet Take 1 packet by mouth daily as needed for constipation     rivaroxaban ANTICOAGULANT (XARELTO) 20 MG TABS tablet Take 20 mg by mouth daily 5/31/2024 at 8am

## 2024-05-31 NOTE — CONSULTS
GASTROENTEROLOGY CONSULTATION      Nallely Manrique  [address] [facility] [address]  89 year old female     Admission Date/Time: 5/31/2024  Primary Care Provider: No Ref-Primary, Physician     We were asked to see the patient in consultation by Tonia Shin PA-C for evaluation of persistent diarrhea and melena.     CC: Diarrhea, melena      HPI:  Nallely Manrique is a 89 year old female  with PMH of polymyalgia rheumatica, DVT post hip replacement, paroxysmal A. Fib (on anticoagulation with Xarelto- last dose 05/30), HTN, HLD, COPD who presented to the ER after being found to have a hemoglobin of 7.1 earlier this week down from recent baseline 8-9.  MCV wnl. B12, folate and peripheral smear pending. Iron/ TIBC wnl. Ferritin pending.     She has reportedly had dark stools but is also on a PO iron supplement. FOBT negative.     She has reportedly had diarrhea since hip replacement in July 2023.     Laboratory testing notable for normocytic anemia with hemoglobin of 8.1, reticulocyte count increased, CRP elevated at 40.91 CMP unremarkable, TSH wnl.     Patient has recently had issues with memory so has some difficulty giving history of symptoms. Her son, Cody is at bedside and helps provide history. She reports she has been having diarrhea for several months. She is not sure how many stools she is having per day. When she has diarrhea it is urgent and explosive and has been associated with some episodes of incontinence. She denies any recent antibiotics. Denies any abdominal pain, nausea, vomiting, fevers or chills. Had a colonoscopy years ago- not sure when exactly but reports that she was told at that time she would not need any further colonoscopies.     No family history of colorectal malignancy, IBD or Celiac disease.        PAST MEDICAL HISTORY:  Patient Active Problem List    Diagnosis Date Noted    Anemia 05/31/2024     Priority: Medium          ROS: A comprehensive ten point review of systems  was negative aside from those in mentioned in the HPI.       MEDICATIONS:   Prior to Admission medications    Medication Sig Start Date End Date Taking? Authorizing Provider   acetaminophen (TYLENOL) 325 MG tablet Take 650 mg by mouth 2 times daily   Yes Reported, Patient   calcium citrate-vitamin D (CITRACAL) 200-6.25 MG-MCG TABS per tablet Take 2 tablets by mouth 2 times daily   Yes Reported, Patient   dimethicone-zinc oxide (JUAN PROTECT) external cream Apply topically 2 times daily Affected area rash   Yes Unknown, Entered By History   dimethicone-zinc oxide (JUAN PROTECT) external cream Apply topically 4 times daily as needed for dry skin or other Apply to affected areas topically as needed for masd after cleansing   Yes Reported, Patient   diphenhydrAMINE-zinc acetate (BENADRYL) 2-0.1 % external cream Apply topically 2 times daily as needed for itching   Yes Reported, Patient   donepezil (ARICEPT) 5 MG tablet Take 5 mg by mouth daily   Yes Unknown, Entered By History   ergocalciferol (ERGOCALCIFEROL) 1.25 MG (98131 UT) capsule Take 50,000 Units by mouth once a week On Wednesdays   Yes Reported, Patient   escitalopram (LEXAPRO) 10 MG tablet Take 10 mg by mouth daily   Yes Unknown, Entered By History   ferrous sulfate (FE TABS) 325 (65 Fe) MG EC tablet Take 325 mg by mouth 2 times daily   Yes Reported, Patient   gabapentin (NEURONTIN) 100 MG capsule Take 200 mg by mouth 3 times daily   Yes Reported, Patient   lactobacillus rhamnosus, GG, (CULTURELL) capsule Take 2 capsules by mouth daily   Yes Unknown, Entered By History   Lidocaine (LIDOCARE) 4 % Patch Place 1 patch onto the skin every 24 hours To prevent lidocaine toxicity, patient should be patch free for 12 hrs daily.Remove in PM.   Yes Reported, Patient   metoprolol tartrate (LOPRESSOR) 25 MG tablet Take 25 mg by mouth 2 times daily   Yes Reported, Patient   mineral oil-white petrolatum (EUCERIN/MINERIN) cream Apply topically 2 times daily Apply to  bilateral forearms   Yes Reported, Patient   mirabegron (MYRBETRIQ) 25 MG 24 hr tablet Take 25 mg by mouth daily   Yes Unknown, Entered By History   polyethylene glycol (MIRALAX) 17 g packet Take 1 packet by mouth daily as needed for constipation   Yes Reported, Patient   rivaroxaban ANTICOAGULANT (XARELTO) 20 MG TABS tablet Take 20 mg by mouth daily   Yes Reported, Patient        ALLERGIES: No Known Allergies     SOCIAL HISTORY:             PHYSICAL EXAM:   /62   Pulse 60   Temp 98.3  F (36.8  C)   Resp 18   Wt 53.5 kg (118 lb)   SpO2 98%   BMI 19.64 kg/m       PHYSICAL EXAM:  General: alert, oriented, NAD  SKIN: Pale   HEAD: NCAT  NECK: Neck supple, trachea midline   EYES: No scleral icterus  RESPIRATORY: Breathing non-labored  CARDIOVASCULAR: RRR  GASTROINTESTINAL: +BS, soft, NT, ND, no HSM, no masses/guarding/rebound  JOINT/EXTREMITIES: extremities normal- no gross deformities noted  NEURO: CN II-XII gorssly intact, A&O x 4  PSYCH: Appropriate affect        LABS:  I reviewed the patient's new clinical lab test results.   Recent Labs   Lab Test 05/31/24  1218 05/31/24  1037 12/21/23  1103   WBC 8.4 8.5 6.7   HGB 7.6* 8.1* 10.6*   MCV 98 99 90   * 786* 379     Recent Labs   Lab Test 05/31/24  1037 12/21/23  1103    138   POTASSIUM 4.4 4.4   CHLORIDE 101 102   CO2 28 28   BUN 16.8 14.7   ANIONGAP 8 8   DANNY 9.3 10.0     Recent Labs   Lab Test 05/31/24  1037 12/21/23  1103   ALBUMIN 3.7 3.7   BILITOTAL 0.6 0.4   ALT <5 7   AST 27 19   ALKPHOS 128 123        IMAGING  05/31/24 CT Head w/o Contrast   IMPRESSION:   1. No evidence for acute intracranial hemorrhage, hydrocephalus or  transcortical infarct. No skull fracture.  2. Brain atrophy and advanced presumed sequela of chronic  microvascular ischemic disease.     CONSULTATION ASSESSMENT AND PLAN  89 year old female with PMH polymyalgia rheumatica, DVT post hip replacement, paroxysmal A. Fib (on anticoagulation with Xarelto- last dose 05/30),  HTN, HLD, COPD who presented to the ER after being found to have a hemoglobin of 7.1 earlier this week     Anemia  Dark Appearing stool   CMP with normal BUN and FOBT negative suggesting GIB unlikely- suspect dark appearing stool is related to iron supplement. Elevated reticulocyte count suggestive of possible hemolytic process as source of anemia. Anemia of chronic disease also consideration.     3. Chronic diarrhea  Noted to have elevated CRP. Differential includes infection, Celiac sprue, IBD, microscopic colitis. Given recent issues with memory I do also question whether there could be some element of overflow diarrhea with underlying constipation. If infectious studies negative would then recommend trial of antidiarrheals. If no response to these could consider colonoscopy.       PLAN  --Ok for regular diet today, NPO at midnight in case EGD is warranted tomorrow   --Hold anticoagulation for now, reasonable to resume tomorrow if hemoglobin stable  --Follow stool, hgb, transfuse PRN  --Trend CRP, check fecal calprotectin, check celiac serologies  --Agree with hematology consultation   --Stool infectious studies             Discussed with Dr. Mijares, GI staff     Total time spent: 40 minutes was spent providing patient care, including patient evaluation, reviewing documentation/test results, and . Thank you for asking us to participate in the care of this patient.      Magda Celestin PA-C   [facility]  Office: [phone]

## 2024-05-31 NOTE — PROGRESS NOTES
RECEIVING UNIT ED HANDOFF REVIEW    ED Nurse Handoff Report was reviewed by: Yuridia Bhatti RN on May 31, 2024 at 2:24 PM

## 2024-05-31 NOTE — CONSULTS
[facility] Hematology / Oncology  Initial Visit / Consultation Note May 31, 2024  Name: Nallely Manrique  :  1935  MRN:  [id number]    --------------------    Assessment / Plan:  Anemia, normocytic and moderate.  Thrombocytosis, moderate.  Diarrhea, chronic.  History of provoked DVT after hip replacement.    Usual culprits include iron deficiency anemia, inflammatory processes and occult bone marrow cancers.  History of PMR important; has been off prednisone for some time; hemoglobin often inversely correlates w/ sed rate in PMR.  CT AP limited exam 5 months prior, but no malignancy at that time.    Awaiting nutritional studies; ferritin may be challenging to interpret w/ inflammation, checking STFR.  Awaiting hemolytic markers.  Checking SPEP, FLC assay as screen for occult bone marrow cancers.  Checking JAK2 cascade testing for occult myeloproliferative cancers.  No acute need for bone marrow biopsy; will consider pending test results.  Await GI evaluation, re: diarrhea and melena.  Unclear if chronic anticoagulation needed; results of repeat LE US and mobility would determine start / stop after provoked DVT.  Will continue to follow.    Thank you kindly for this consultation.  Lemuel Lloyd MD    --------------------    Interval History:  Nallely present for evaluation of anemia.  Much of history provided by son.  Worsening energy and stamina issues.  Dark stools for some time; on iron replacement.  Has been on Xarelto after DVT from hip replacement.  Eats fairly well.  No other bleeding.  History of PMR; on prednisone for MANY years by report.  Status post bilateral hip replacement.  Has never had marrow.    --------------------    Review of Systems:  10 point ROS negative except for that above.    Past Medical / Surgical History:  No past medical history on file.  No past surgical history on file.  Patient Active Problem List   Diagnosis    Anemia       Family History:  Negative for  leukemia, lymphoma.    Social History:  Lives [place] [facility] living.  No etoh, tobacco.    Medications / Allergies:  Reviewed in EMR.    --------------------    Physical Exam:  VS: BP (!) 140/53 (BP Location: Left arm, Patient Position: Semi-Dumont's, Cuff Size: Adult Regular)   Pulse 62   Temp 98.2  F (36.8  C) (Oral)   Resp 16   Wt 53.5 kg (118 lb)   SpO2 98%   BMI 19.64 kg/m    GEN: Well appearing.  JENNY: No cervical, clavicular, axillary adenopathy.    Labs / Imaging / Path:  Reviewed CBC, CMP.

## 2024-06-01 ENCOUNTER — APPOINTMENT (OUTPATIENT)
Dept: PHYSICAL THERAPY | Facility: CLINIC | Age: 89
DRG: 812 | End: 2024-06-01
Attending: PHYSICIAN ASSISTANT
Payer: MEDICARE

## 2024-06-01 PROBLEM — R19.7 DIARRHEA, UNSPECIFIED TYPE: Status: ACTIVE | Noted: 2024-06-01

## 2024-06-01 PROBLEM — K92.1 BLACK STOOL: Status: ACTIVE | Noted: 2024-06-01

## 2024-06-01 PROBLEM — D64.9 ANEMIA, UNSPECIFIED TYPE: Status: ACTIVE | Noted: 2024-06-01

## 2024-06-01 LAB
ANION GAP SERPL CALCULATED.3IONS-SCNC: 9 MMOL/L (ref 7–15)
BASOPHILS # BLD AUTO: 0.1 10E3/UL (ref 0–0.2)
BASOPHILS NFR BLD AUTO: 1 %
BUN SERPL-MCNC: 12.3 MG/DL (ref 8–23)
CALCIUM SERPL-MCNC: 8.8 MG/DL (ref 8.8–10.2)
CHLORIDE SERPL-SCNC: 102 MMOL/L (ref 98–107)
CREAT SERPL-MCNC: 0.65 MG/DL (ref 0.51–0.95)
DAT POLY: NEGATIVE
DEPRECATED HCO3 PLAS-SCNC: 27 MMOL/L (ref 22–29)
EGFRCR SERPLBLD CKD-EPI 2021: 84 ML/MIN/1.73M2
EOSINOPHIL # BLD AUTO: 0.2 10E3/UL (ref 0–0.7)
EOSINOPHIL NFR BLD AUTO: 3 %
ERYTHROCYTE [DISTWIDTH] IN BLOOD BY AUTOMATED COUNT: 15.5 % (ref 10–15)
FERRITIN SERPL-MCNC: 516 NG/ML (ref 11–328)
GLUCOSE SERPL-MCNC: 93 MG/DL (ref 70–99)
HAPTOGLOB SERPL-MCNC: <10 MG/DL (ref 30–200)
HCT VFR BLD AUTO: 25.6 % (ref 35–47)
HGB BLD-MCNC: 8 G/DL (ref 11.7–15.7)
IMM GRANULOCYTES # BLD: 0.1 10E3/UL
IMM GRANULOCYTES NFR BLD: 1 %
INTERPRETATION: NORMAL
LYMPHOCYTES # BLD AUTO: 1.6 10E3/UL (ref 0.8–5.3)
LYMPHOCYTES NFR BLD AUTO: 21 %
MCH RBC QN AUTO: 30.4 PG (ref 26.5–33)
MCHC RBC AUTO-ENTMCNC: 31.3 G/DL (ref 31.5–36.5)
MCV RBC AUTO: 97 FL (ref 78–100)
MONOCYTES # BLD AUTO: 0.7 10E3/UL (ref 0–1.3)
MONOCYTES NFR BLD AUTO: 10 %
NEUTROPHILS # BLD AUTO: 5.1 10E3/UL (ref 1.6–8.3)
NEUTROPHILS NFR BLD AUTO: 64 %
NRBC # BLD AUTO: 0 10E3/UL
NRBC BLD AUTO-RTO: 0 /100
PLATELET # BLD AUTO: 794 10E3/UL (ref 150–450)
POTASSIUM SERPL-SCNC: 4.3 MMOL/L (ref 3.4–5.3)
RBC # BLD AUTO: 2.63 10E6/UL (ref 3.8–5.2)
SIGNIFICANT RESULTS: NORMAL
SODIUM SERPL-SCNC: 138 MMOL/L (ref 135–145)
SPECIMEN DESCRIPTION: NORMAL
SPECIMEN EXPIRATION DATE: NORMAL
TEST DETAILS, MDL: NORMAL
VIT B12 SERPL-MCNC: 568 PG/ML (ref 232–1245)
WBC # BLD AUTO: 7.7 10E3/UL (ref 4–11)

## 2024-06-01 PROCEDURE — 36415 COLL VENOUS BLD VENIPUNCTURE: CPT | Performed by: INTERNAL MEDICINE

## 2024-06-01 PROCEDURE — 80048 BASIC METABOLIC PNL TOTAL CA: CPT | Performed by: PHYSICIAN ASSISTANT

## 2024-06-01 PROCEDURE — 86880 COOMBS TEST DIRECT: CPT | Performed by: INTERNAL MEDICINE

## 2024-06-01 PROCEDURE — 83521 IG LIGHT CHAINS FREE EACH: CPT | Performed by: INTERNAL MEDICINE

## 2024-06-01 PROCEDURE — 97530 THERAPEUTIC ACTIVITIES: CPT | Mod: GP

## 2024-06-01 PROCEDURE — G0452 MOLECULAR PATHOLOGY INTERPR: HCPCS | Mod: 26 | Performed by: STUDENT IN AN ORGANIZED HEALTH CARE EDUCATION/TRAINING PROGRAM

## 2024-06-01 PROCEDURE — 82955 ASSAY OF G6PD ENZYME: CPT | Performed by: INTERNAL MEDICINE

## 2024-06-01 PROCEDURE — G0378 HOSPITAL OBSERVATION PER HR: HCPCS

## 2024-06-01 PROCEDURE — 97161 PT EVAL LOW COMPLEX 20 MIN: CPT | Mod: GP

## 2024-06-01 PROCEDURE — 99232 SBSQ HOSP IP/OBS MODERATE 35: CPT | Performed by: INTERNAL MEDICINE

## 2024-06-01 PROCEDURE — 84165 PROTEIN E-PHORESIS SERUM: CPT | Mod: 26 | Performed by: PATHOLOGY

## 2024-06-01 PROCEDURE — 250N000013 HC RX MED GY IP 250 OP 250 PS 637: Performed by: INTERNAL MEDICINE

## 2024-06-01 PROCEDURE — 81339 MPL GENE SEQ ALYS EXON 10: CPT | Performed by: INTERNAL MEDICINE

## 2024-06-01 PROCEDURE — 84165 PROTEIN E-PHORESIS SERUM: CPT | Mod: TC | Performed by: PATHOLOGY

## 2024-06-01 PROCEDURE — 85025 COMPLETE CBC W/AUTO DIFF WBC: CPT | Performed by: PHYSICIAN ASSISTANT

## 2024-06-01 PROCEDURE — 250N000013 HC RX MED GY IP 250 OP 250 PS 637: Performed by: PHYSICIAN ASSISTANT

## 2024-06-01 PROCEDURE — 36415 COLL VENOUS BLD VENIPUNCTURE: CPT | Performed by: PHYSICIAN ASSISTANT

## 2024-06-01 PROCEDURE — 81338 MPL GENE COMMON VARIANTS: CPT | Performed by: INTERNAL MEDICINE

## 2024-06-01 PROCEDURE — 84155 ASSAY OF PROTEIN SERUM: CPT | Performed by: INTERNAL MEDICINE

## 2024-06-01 PROCEDURE — 81270 JAK2 GENE: CPT | Performed by: INTERNAL MEDICINE

## 2024-06-01 PROCEDURE — 120N000001 HC R&B MED SURG/OB

## 2024-06-01 PROCEDURE — 99233 SBSQ HOSP IP/OBS HIGH 50: CPT | Performed by: HOSPITALIST

## 2024-06-01 RX ORDER — FOLIC ACID 1 MG/1
1 TABLET ORAL DAILY
Status: DISCONTINUED | OUTPATIENT
Start: 2024-06-01 | End: 2024-06-05 | Stop reason: HOSPADM

## 2024-06-01 RX ADMIN — DONEPEZIL HYDROCHLORIDE 5 MG: 5 TABLET, FILM COATED ORAL at 08:29

## 2024-06-01 RX ADMIN — METOPROLOL TARTRATE 25 MG: 25 TABLET, FILM COATED ORAL at 08:28

## 2024-06-01 RX ADMIN — GABAPENTIN 200 MG: 100 CAPSULE ORAL at 08:28

## 2024-06-01 RX ADMIN — MIRABEGRON 25 MG: 25 TABLET, FILM COATED, EXTENDED RELEASE ORAL at 08:28

## 2024-06-01 RX ADMIN — LIDOCAINE 1 PATCH: 4 PATCH TOPICAL at 08:29

## 2024-06-01 RX ADMIN — FOLIC ACID 1 MG: 1 TABLET ORAL at 13:40

## 2024-06-01 RX ADMIN — ACETAMINOPHEN 650 MG: 325 TABLET, FILM COATED ORAL at 19:44

## 2024-06-01 RX ADMIN — GABAPENTIN 200 MG: 100 CAPSULE ORAL at 19:44

## 2024-06-01 RX ADMIN — ESCITALOPRAM OXALATE 10 MG: 10 TABLET ORAL at 08:28

## 2024-06-01 RX ADMIN — GABAPENTIN 200 MG: 100 CAPSULE ORAL at 13:40

## 2024-06-01 RX ADMIN — METOPROLOL TARTRATE 25 MG: 25 TABLET, FILM COATED ORAL at 19:44

## 2024-06-01 ASSESSMENT — ACTIVITIES OF DAILY LIVING (ADL)
ADLS_ACUITY_SCORE: 39
ADLS_ACUITY_SCORE: 40
ADLS_ACUITY_SCORE: 39
ADLS_ACUITY_SCORE: 39
ADLS_ACUITY_SCORE: 42
ADLS_ACUITY_SCORE: 39
ADLS_ACUITY_SCORE: 40
ADLS_ACUITY_SCORE: 44
ADLS_ACUITY_SCORE: 40
ADLS_ACUITY_SCORE: 40
ADLS_ACUITY_SCORE: 39
ADLS_ACUITY_SCORE: 42
ADLS_ACUITY_SCORE: 39
ADLS_ACUITY_SCORE: 39
ADLS_ACUITY_SCORE: 40
ADLS_ACUITY_SCORE: 39
ADLS_ACUITY_SCORE: 39
ADLS_ACUITY_SCORE: 40
ADLS_ACUITY_SCORE: 39
ADLS_ACUITY_SCORE: 40
ADLS_ACUITY_SCORE: 44

## 2024-06-01 NOTE — PROGRESS NOTES
Observation goals  PRIOR TO DISCHARGE        Comments: -vital signs normal or at patient baseline: met   -GI consult: following   -Hematology consult: following   -Anemia work-up completed: not met   -Stool studies: partially met       A/OX4,VSS.Denies pain. Up with 1/walker to [facility], had BM X3  on my shift., no blood in her stool.  Tele reads SB. Hgb 8.0 today.Regular diet. Discharge possible on Monday.

## 2024-06-01 NOTE — PROGRESS NOTES
GASTROENTEROLOGY PROGRESS NOTE    SUBJECTIVE:  Continues with diarrhea,, reports 2 episodes per da and usually triggered after eating, has been chronic issues for almost a year now.  Denies abd pain.  No fresh red blood with stools, they are dark but she is on iron.    OBJECTIVE:    BP (!) 149/63 (BP Location: Left arm)   Pulse 64   Temp 99.2  F (37.3  C) (Oral)   Resp 18   Wt 55.4 kg (122 lb 2.2 oz)   SpO2 96%   BMI 20.32 kg/m    Temp (24hrs), Av.2  F (36.8  C), Min:97.8  F (36.6  C), Max:99.2  F (37.3  C)    Patient Vitals for the past 72 hrs:   Weight   24 0314 55.4 kg (122 lb 2.2 oz)   24 0950 53.5 kg (118 lb)       Intake/Output Summary (Last 24 hours) at 2024 0800  Last data filed at 2024 1850  Gross per 24 hour   Intake 240 ml   Output --   Net 240 ml         PHYSICAL EXAM    Constitutional: NAD, comfortable  Cardiovascular: RRR  Respiratory: CTAB  Abdomen: soft, non-tender, nondistended        Additional Comments:  ROS, FH, SH: See initial GI consult for details.    I have reviewed the patient's new clinical lab results:    Recent Labs   Lab Test 24  0629 24  2313 24  1218 24  1037   WBC 7.7  --  8.4 8.5   HGB 8.0* 7.6* 7.6* 8.1*   MCV 97  --  98 99   *  --  747* 786*     Recent Labs   Lab Test 24  0629 24  1037 23  1103    137 138   POTASSIUM 4.3 4.4 4.4   CHLORIDE 102 101 102   CO2 27 28 28   BUN 12.3 16.8 14.7   CR 0.65 0.73 0.69   ANIONGAP 9 8 8   DANNY 8.8 9.3 10.0     Recent Labs   Lab Test 24  1037 23  1103   ALBUMIN 3.7 3.7   BILITOTAL 0.6 0.4   ALT <5 7   AST 27 19   ALKPHOS 128 123         A/P  Past medical history significant for polymyalgia rheumatica, DVT post hip replacement, paroxysmal atrial fibrillation on Xarelto (currently held), hypertension, hyperlipidemia, and COPD who presented to the ER for acute on chronic anemia.    Chronic diarrhea since 2023.  Donepezil can cause diarrhea.  Enteric  panel is negative for infection.  Will need colonoscopy at some point.    Acute on chronic anemia-hem/onc is doing workup, haptoglobin is low.  FOBT was negative for GI bleeding.  Hemoglobin is improved to 8.0 this morning in comparison to yesterday.  No evidence of active GI bleeding.  No need for any urgent procedures at this time.  Her diet can be resumed.    Can consider egd/colon early next week pending workup from hem/onc.    We will see her Monday, please call me with any questions over the weekend.    Nice Diana Kovacs MD  [facility]

## 2024-06-01 NOTE — PROVIDER NOTIFICATION
"MD Notification    Notified Person: MD    Notified Person Name: Dr. Art Barclay    Notification Date/Time:    Notification Interaction: [employer] messaging     Purpose of Notification:  Short stay room 512  FYI.   Could you please review the enteric panel result.   Lactate [id number]=251, sed Rate = 51.  Orders Received:    Comments: \"you can take off isolation\"  "

## 2024-06-01 NOTE — PROGRESS NOTES
PRIMARY Concern: Anemia, diarrhea/melena  SAFETY RISK Concerns (fall risk, behaviors, etc.): Fall risk      Isolation/Type: Enteric  Tests/Procedures for NEXT shift: Labs  Consults? (Pending/following, signed-off?) [facility], hem/onc, PT/SW following  Where is patient from? (Home, TCU, etc.): [facility]  Other Important info for NEXT shift: Patient has own wheelchair in room. Enteric panel pending.  Anticipated DC date & active delays: TBD  _____________________________________________________________________________  SUMMARY NOTE:                Orientation/Cognitive: AxOx4  Observation Goals (Met/ Not Met): Not Met  Mobility Level/Assist Equipment: A1 pivot to commode, wheelchair at baseline  Antibiotics & Plan (IV/po, length of tx left): None  Pain Management: Denies pain  Tele/VS/O2: VSS on RA  ABNL Lab/BG: Hg=7.6  Diet: Reg, NPO at midnight  Bowel/Bladder: Cont of B/B  Skin Concerns: Blanchable redness on bottom, dry heels/feet  Drains/Devices: PIV SL  Patient Stated Goal for Today: To rest

## 2024-06-01 NOTE — PLAN OF CARE
Goal Outcome Evaluation:  PRIMARY Concern: Anemia, diarrhea/melena  SAFETY RISK Concerns (fall risk, behaviors, etc.): Fall risk      Isolation/Type: None   Tests/Procedures for NEXT shift: Labs  Consults? (Pending/following, signed-off?) [facility], hem/onc following  PT/SW consult pending   Where is patient from? (Home, TCU, etc.): [facility]  Other Important info for NEXT shift: Patient has own wheelchair in room.   Anticipated DC date & active delays: TBD    SUMMARY NOTE:                Orientation/Cognitive: A&Ox4  Observation Goals (Met/ Not Met): Not Met  Mobility Level/Assist Equipment: Ax1 pivot to commode, wheelchair at baseline  Antibiotics & Plan (IV/po, length of tx left): None  Pain Management: Denies pain  Tele/VS/O2: VSS on RA  ABNL Lab/BG: Hg=7.6  Diet: Reg, NPO  Bowel/Bladder: Cont of B/B  Skin Concerns: Blanchable redness on bottom, dry heels/feet  Drains/Devices: PIV SL  Patient Stated Goal for Today: rest          Observation goals  PRIOR TO DISCHARGE       Comments:   -vital signs normal or at patient baseline=Met  -GI consult= Met  -Hematology consult=Met  -Anemia work-up completed=Not Met  -Stool studies=Partially met

## 2024-06-01 NOTE — PROGRESS NOTES
[facility] Hematology / Oncology  Progress Note  Name: Nallely Manrique  :  1935    MRN:  [id number]    --------------------    Assessment / Plan:  Anemia, normocytic and moderate.  Thrombocytosis, moderate.  Diarrhea, chronic.  History of provoked DVT after hip replacement.    Usual culprits include anemia and thrombocytosis are SAVANNAH, inflammatory processes and occult bone marrow cancers.  History of PMR important; has been off prednisone for some time; sed rate mildly elevated for age.  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  Will add on PNH and G6PD testing for hemolysis in AM; valve hemolysis possible too (could not visualize AV well).  STFR, SPEP, FLC, JAK2 cascade pending.  Start folic acid.  CT AP limited exam 5 months prior, but no malignancy at that time.  No acute need for bone marrow biopsy; will consider pending test results.    Lemuel Lloyd MD    --------------------    Interval History:  Stable.  No complaints.  Eating lunch.    Social History:  Social History     Tobacco Use    Smoking status: Not on file    Smokeless tobacco: Not on file   Substance Use Topics    Alcohol use: Not on file     Family History:  No family history on file.  Immunizations:  Immunization History   Administered Date(s) Administered    COVID-19 MONOVALENT 12+ (Pfizer) 10/10/2023     Health Maintenance Due   Topic Date Due    DTAP/TDAP/TD IMMUNIZATION (1 - Tdap) Never done    ZOSTER IMMUNIZATION (1 of 2) Never done    Pneumococcal Vaccine: 65+ Years (2 of 2 - PCV) 04/15/2023    COVID-19 Vaccine (2023- season) 2023     --------------------    Physical Exam:  VS: /58 (BP Location: Left arm)   Pulse 56   Temp 97.7  F (36.5  C) (Oral)   Resp 18   Wt 55.4 kg (122 lb 2.2 oz)   SpO2 95%   BMI 20.32 kg/m  .  GEN: Well appearing.    Labs / Imaging:  Reviewed CBC, LDH, hapto.

## 2024-06-01 NOTE — PROGRESS NOTES
[facility]    Medicine Progress Note - Hospitalist Service    Date of Admission:  5/31/2024    Assessment & Plan   Nallely Manrique is a 89 year old female admitted on 5/31/2024 with a PMH significant for polymyalgia rheumatica, DVT post hip replacement, paroxysmal Afib, HTN, HLD, COPD who presented from [facility] for further evaluation of anemia after having a Hgb 7.1 earlier this week. Note baseline Hgb 8-9 with chronic thrombocytosis dating back to 1/2023. During interview, son actually reports more concern about pt's persistent diarrhea that has been ongoing since 7/2023. Registered under observation status for further evaluation and treatment.     Persistent Diarrhea  Diarrhea since hip replacement in July '23. Prior colonoscopy years ago reported as normal. No known history of IBD. ? Microscopic colitis? CT A/P 12/2023 negative for acute pathology, extensive colonic diverticulosis, no diverticulitis.   - Registered to observation. Consider inpatient admission for anemia and hemolysis as noted below.   - [facility] consulted, appreciate their assistance.    - Enteric panel negative on 5/31/24.  - ESR elevated at 51.  - Calprotectin in process.  - Monitor and record stool output.  - No plans for EGD/colonoscopy for now pending hematology work-up, may consider early next week. Will resume regular diet today.    Acute on chronic borderline macrocytic anemia  Chronic thrombocytosis  Chronic melena, but suspect related to iron supplementation  No evidence of active GI bleed, vaginal bleed or hematuria. No hx of PUD, not GERD. On Xarelto with last dose the evening prior to admission. Takes iron supplementation. No abdominal pain, change in appetite. Not on special diet. Prior colonoscopy years ago reported as normal. No known hematologic disorder  *Hemoglobin since admission 8.1 --> 7.6, was down to 7.1 earlier in the week; baseline hemoglobin dating back to 1/2023 is in 8-9  range.  *Platelets elevated since admission 700-800 range; baseline platelets back to 1/2023 have been 500-900 range.  *Iron studies, TSH normal, folate normal. Hemoccult negative.  - Peripheral smear and B12 pending.  - Haptoglobin low, LDH elevated. Concerning for hemolysis.  - Hemoglobin stable at 8.0 on 6/1/24, recheck in AM.  - [facility] consulted, appreciate their assistance.  - Discussed with Dr. Lloyd, likely planning further hemolysis labs today, potential initiation of steroids soon.  - [facility] consultation, appreciate their assistance.  - No plan for EGD/colonoscopy at this time, may consider early next week as noted above.  - Conditional orders for transfusion if hemoglobin < 7.  - PTA Xarelto on hold for now pending further input from consultants.    Mild acute encephalopathy  Cognitive impairment   *Son reports cognitive decline since surgery in July '23, but more acute memory decline over the course of the past week.  - CT head on 5/31/2024 showed no acute changes, did show brain atrophy and advanced presumed sequelae of chronic microvascular ischemic disease.    - Continue PTA Aricept.    Generalized Weakness  Reports recent fatigue. Wheelchair bound, transfers herself to the toilet.   - PT consulted, appreciate their assistance.  - SW for discharge planning, appreciate their assistance.  BASELINE PRIOR TO ADMISSION  1.  Living Situation:  [facility]  2. Ambulation:  wheelchair  3.  Mental status:  alert and oriented X4  4. Diet: regular   5.  Family contact:  Extended Emergency Contact Information  Primary Emergency Contact: Cody Manrique  Mobile Phone: [phone]  Relation: Son     Depression  - Continue PTA Lexapro.    Paroxsymal Atrial Fibrillation  History of DVT post surgery  *Recent LE US negative.  - PTA Xarelto held as noted above, defer to hematology/GI regarding timing of restarting.  - Continue PTA Metoprolol with hold parameters.    PMR  - No acute issues. Baseline  chronic hip pain. Not on steroids.     Cardiac murmur  Noted systolic murmur in the aortic region. Patient reports she has had this for years. No recent cardiac symptoms. No prior echo available in epic.  - TTE on 6/1/2024 showed LVEF 65 to 70%, mild to moderate tricuspid regurg, mild pulmonary hypertension, at least mild aortic stenosis - see report in epic.  - Consider repeat limited TTE on 6/3/2024 to confirm degree of aortic stenosis.       Observation Goals: -vital signs normal or at patient baseline, -GI consult, -Hematology consult , -Anemia work-up completed , -Stool studies , Nurse to notify provider when observation goals have been met and patient is ready for discharge.  Diet: Regular Diet Adult    DVT Prophylaxis: Pneumatic Compression Devices  Arguelles Catheter: Not present  Lines: None     Cardiac Monitoring: ACTIVE order. Indication: Tachyarrhythmias, acute (48 hours)  Code Status: No CPR- Do NOT Intubate      Clinically Significant Risk Factors Present on Admission               # Drug Induced Coagulation Defect: home medication list includes an anticoagulant medication                    Disposition Plan     Medically Ready for Discharge: Anticipated in 2-4 Days             Charli Cartwright MD  Hospitalist Service  [facility]  Securely message with [employer] (more info)  Text page via [facility] Paging/Directory   ______________________________________________________________________    Interval History   Nallely Manrique was seen this morning.  She feels okay.  She denies any new symptoms and states she is not sure why she is in the hospital.  Has had some bowel movements while in the hospital, states they are kind of loose but no melena or hematochezia.  No abdominal pain.  No nausea/vomiting.  No lightheadedness, fevers, chest pain, shortness of breath.  Discussed reason for hospitalization and plan of care as noted above.  Discussed plan of care with nursing and  hematology.    Physical Exam   Vital Signs: Temp: 97.7  F (36.5  C) Temp src: Oral BP: 138/58 Pulse: 56   Resp: 18 SpO2: 95 % O2 Device: None (Room air)    Weight: 122 lbs 2.16 oz    Constitutional: awake, alert, cooperative, no apparent distress, laying in the hospital bed  Respiratory: no increased work of breathing, clear to auscultation bilaterally, no crackles or wheezing  Cardiovascular: regular rate and rhythm, systolic murmur noted  GI: bowel sounds positive, soft, non-distended, non-tender  Skin: warm, dry  Musculoskeletal: no lower extremity pitting edema present  Neurologic: awake, alert, answers questions appropriately, moves all extremities    Medical Decision Making       55 MINUTES SPENT BY ME on the date of service doing chart review, history, exam, documentation & further activities per the note.      Data     I have personally reviewed the following data over the past 24 hrs:    7.7  \   8.0 (L)   / 794 (H)     138 102 12.3 /  93   4.3 27 0.65 \     TSH: N/A T4: N/A A1C: N/A     Procal: N/A CRP: N/A Lactic Acid: N/A       Ferritin:  N/A % Retic:  4.5 (H) LDH:  495 (H)       Imaging results reviewed over the past 24 hrs:   Recent Results (from the past 24 hour(s))   CT Head w/o Contrast    Narrative    EXAM: CT HEAD W/O CONTRAST  5/31/2024 2:10 PM     HISTORY: increased confusion. Recurrent falls on Xarelto       COMPARISON: None    TECHNIQUE: Using multidetector thin collimation helical acquisition  technique, axial, coronal and sagittal CT images from the skull base  to the vertex were obtained without intravenous contrast.   (topogram) image(s) also obtained and reviewed. Dose reduction  techniques were used.    FINDINGS:  No acute intracranial hemorrhage, mass effect, or midline shift.  Subcortical, periventricular areas of white matter hypoattenuation,  advanced parenchymal volume loss and compensatory ventricular  dilatation compatible with sequelae of chronic microvascular  ischemic  disease. Preserved gray-white matter differentiation. Dense basal  ganglia calcifications.    Atraumatic calvarium. No substantial paranasal sinus mucosal disease.  Clear mastoid air cells. Nonfocal orbits.       Impression    IMPRESSION:   1. No evidence for acute intracranial hemorrhage, hydrocephalus or  transcortical infarct. No skull fracture.  2. Brain atrophy and advanced presumed sequela of chronic  microvascular ischemic disease.    GEE SYKES DO         SYSTEM ID:  [id number]   Echocardiogram Complete   Result Value    LVEF  65-70%    Narrative    [id number]  [id number]  [id number]^ELIZABETH^MAR^SUJATHA     [facility]  Echocardiography Laboratory  [address] [place] [address]     Name: ERICK GRIMM  MRN: [id number]  : 1935  Study Date: 2024 03:36 PM  Age: 89 yrs  Gender: Female  Patient Location: [facility]  Reason For Study: Murmur  Ordering Physician: MAR JOHNSON  Performed By: EEETELVINA     BSA: 1.6 m2  Height: 65 in  Weight: 118 lb  HR: 58  BP: 133/52 mmHg  ______________________________________________________________________________  Procedure  Complete Portable Echo Adult. Optison (NDC #[id number]) given intravenously.  ______________________________________________________________________________  Interpretation Summary     The visual ejection fraction is 65-70%.  Diastolic Doppler findings (E/E' ratio and/or other parameters) suggest left  ventricular filling pressures are increased.  There is moderate to severe mitral annular calcification.  There is mild to moderate (1-2+) tricuspid regurgitation.  Mild (35-45mmHg) pulmonary hypertension is present.  The aortic valve is abnormal but not well seen. There is trace aortic valve  insufficiency. By doppler there is only mild aortic valve stenosis-- I have  some concern the doppler may be underestimating the degree of aortic stenosis.  If clinically indicated please request a  limited repeat echo during weekday to  re-confirm degree of aortic valve stenosis  ______________________________________________________________________________  Left Ventricle  The left ventricle is normal in size. There is borderline concentric left  ventricular hypertrophy. The visual ejection fraction is 65-70%. Diastolic  Doppler findings (E/E' ratio and/or other parameters) suggest left ventricular  filling pressures are increased. Diastolic function not assessed due to  significant mitral annular calcification. Normal left ventricular wall motion.     Right Ventricle  The right ventricle is normal in size and function.     Atria  The left atrium is moderately dilated. Right atrial size is normal.     Mitral Valve  There is moderate to severe mitral annular calcification. There is trace  mitral regurgitation. There is no mitral valve stenosis.     Tricuspid Valve  There is mild to moderate (1-2+) tricuspid regurgitation. The right  ventricular systolic pressure is approximated at 35.2 mmHg plus the right  atrial pressure. IVC diameter <2.1 cm collapsing >50% with sniff suggests a  normal RA pressure of 3 mmHg. Mild (35-45mmHg) pulmonary hypertension is  present.     Aortic Valve  The aortic valve is abnormal but not well seen. There is trace aortic valve  insufficiency. By doppler there is only mild aortic valve stenosis-- I have  some concern the doppler may be underestimating the degree of aortic stenosis.  If clinically indicated please request a limited repeat echo during weekday to  re-confirm degree of aortic valve stenosis.     Pulmonic Valve  The pulmonic valve is not well seen, but is grossly normal.     Vessels  (The upper limit of normal is 3.7cm for aortic root diameter.). The aortic  root is normal size. The inferior vena cava was normal in size with preserved  respiratory variability.     Pericardium  The pericardium appears normal.     Rhythm  Sinus rhythm was  noted.  ______________________________________________________________________________  MMode/2D Measurements & Calculations     IVSd: 0.97 cm  LVIDd: 3.6 cm  LVIDs: 2.2 cm  LVPWd: 1.2 cm  FS: 38.9 %  LV mass(C)d: 120.5 grams  LV mass(C)dI: 76.2 grams/m2  Ao root diam: 3.4 cm  LA dimension: 4.4 cm  asc Aorta Diam: 3.6 cm  LA/Ao: 1.3  Ao root diam index Ht(cm/m): 2.0  Ao root diam index BSA (cm/m2): 2.1  Asc Ao diam index BSA (cm/m2): 2.3  Asc Ao diam index Ht(cm/m): 2.2  RWT: 0.64     Doppler Measurements & Calculations  MV E max dheeraj: 98.5 cm/sec  MV A max dheeraj: 88.1 cm/sec  MV E/A: 1.1  MV dec time: 0.16 sec     Ao V2 max: 162.0 cm/sec  Ao max PG: 10.0 mmHg  Ao V2 mean: 110.0 cm/sec  Ao mean P.0 mmHg  Ao V2 VTI: 38.8 cm  LV V1 max P.2 mmHg  LV V1 max: 102.0 cm/sec  LV V1 VTI: 21.6 cm  PA acc time: 0.13 sec  TR max dheeraj: 296.5 cm/sec  TR max P.2 mmHg  AV Dheeraj Ratio (DI): 0.63  E/E' av.1  Lateral E/e': 14.8  Medial E/e': 19.3     ______________________________________________________________________________  Report approved by: Aditya Negron 2024 11:24 AM            no

## 2024-06-01 NOTE — PROGRESS NOTES
Observation goals  PRIOR TO DISCHARGE       Comments:   -vital signs normal or at patient baseline=Met  -GI consult= Met  -Hematology consult=Met  -Anemia work-up completed=Not Met  -Stool studies=Partially met

## 2024-06-01 NOTE — UTILIZATION REVIEW
Admission Status; Secondary Review Determination    Under the authority of the Utilization Management Committee, the utilization review process indicated a secondary review on the above patient. The review outcome is based on review of the medical records, discussions with staff, and applying clinical experience noted on the date of the review.    (x) Inpatient Status Appropriate - This patient's medical care is consistent with medical management for inpatient care and reasonable inpatient medical practice.    RATIONALE FOR DETERMINATION: 89-year-old female on anticoagulation with previous history of DVT, atrial fibrillation as well as a prior history of some iron deficiency anemia who now presents to hospital with significant symptomatic new anemia as well as reported dark black stools.  Patient's hemoglobin approximately 6 months ago was 10.6 and was found to be 8.1 on admission and did drop as low as 7.6 late that evening with associated increase inflammatory markers sed rate 51 and CRP level 41.  Iron studies did not reveal a deficiency the patient has significantly elevated LD H around 500 and the haptoglobin less than 10 concerning for acute hemolysis.  Due to patient's age some mild worsening of mental status with associated marked anemia concerning for active hemolysis, patient expected require greater than 2 nights in the hospital for ongoing evaluation and management appropriate for inpatient care.    At the time of admission with the information available to the attending physician more than 2 nights Hospital complex care was anticipated, based on patient risk of adverse outcome if treated as outpatient and complex care required. Inpatient admission is appropriate based on the Medicare guidelines.    This document was produced using voice recognition software    The information on this document is developed by the utilization review team in order for the business office to ensure compliance. This only  denotes the appropriateness of proper admission status and does not reflect the quality of care rendered.    The definitions of Inpatient Status and Observation Status used in making the determination above are those provided in the CMS Coverage Manual, Chapter 1 and Chapter 6, section 70.4.    Sincerely,    Jonathan Huffman MD  Utilization Review  Physician Advisor  [facility].

## 2024-06-01 NOTE — PROGRESS NOTES
"   06/01/24 [id number]   Appointment Info   Signing Clinician's Name / Credentials (PT) Lázaro Holdercarlton Bynum DPT   Living Environment   People in Home alone   Current Living Arrangements assisted living   Home Accessibility no concerns   Living Environment Comments Pt lives in [facility], has services for bathing and meals.   Self-Care   Usual Activity Tolerance fair   Current Activity Tolerance fair   Equipment Currently Used at Home wheelchair, manual   Fall history within last six months   (pt has hx of falls while using walker per son report, thus began using w/c.)   Activity/Exercise/Self-Care Comment Pt is IND with dressing and toileting, w/c bound and performs stand pivot transfers IND. Has assistance with showers, and higher level IADLs.   General Information   Onset of Illness/Injury or Date of Surgery 05/31/24   Referring Physician Tonia Shin PA-C   Pertinent History of Current Problem (include personal factors and/or comorbidities that impact the POC) Pt is 88 yo female who, per chart, \"PMH significant for polymyalgia rheumatica, DVT post hip replacement, paroxysmal Afib, HTN, HLD, COPD who presented from [facility] for further evaluation of anemia after having a Hgb 7.1 earlier this week. Note baseline Hgb 8-9 with chronic thrombocytosis dating back to 1/2023. During interview, son actually reports more concern about pt's persistent diarrhea that has been ongoing since 7/2023. Registered under observation status for further evaluation and treatment. \"   Existing Precautions/Restrictions fall   Cognition   Affect/Mental Status (Cognition) confused   Cognitive Status Comments pt able to follow 1- to 2-step commands well during session, difficulty answering some living environment questions during eval, son assisted with this.   Pain Assessment   Patient Currently in Pain No   Integumentary/Edema   Integumentary/Edema no deficits were identifed   Posture    Posture Forward head position   Range of " Motion (ROM)   Range of Motion ROM is WFL   Strength (Manual Muscle Testing)   Strength (Manual Muscle Testing) Deficits observed during functional mobility   Bed Mobility   Bed Mobility supine-sit   Comment, (Bed Mobility) SBA, from HOB at 30 degrees. Use of R bed rail.   Transfers   Comment, (Transfers) STS without AD, CG-Gio, kyphotic posture.   Gait/Stairs (Locomotion)   Comment, (Gait/Stairs) does not ambulate at baseline.   Sensory Examination   Sensory Perception Comments light touch intact per screen of distal B LEs.   Clinical Impression   Criteria for Skilled Therapeutic Intervention Yes, treatment indicated   PT Diagnosis (PT) impaired functional mobility   Influenced by the following impairments decreased strength, impaired balance.   Functional limitations due to impairments difficulty with bed mobility and transfers.   Clinical Presentation (PT Evaluation Complexity) stable   Clinical Presentation Rationale clinical judgment   Clinical Decision Making (Complexity) low complexity   Planned Therapy Interventions (PT) balance training;bed mobility training;home exercise program;ROM (range of motion);strengthening;stretching;transfer training;wheelchair management/propulsion training;progressive activity/exercise   Risk & Benefits of therapy have been explained evaluation/treatment results reviewed;care plan/treatment goals reviewed;risks/benefits reviewed;current/potential barriers reviewed;participants voiced agreement with care plan;participants included;patient   PT Total Evaluation Time   PT Eval, Low Complexity Minutes (80932) 10   Physical Therapy Goals   PT Frequency 5x/week   PT Predicted Duration/Target Date for Goal Attainment 06/08/24   PT Goals Bed Mobility;Transfers;Wheelchair Mobility   PT: Bed Mobility Independent;Supine to/from sit   PT: Transfers Modified independent;Sit to/from stand;Bed to/from chair;Assistive device  (stand-pivot bed <> w/c.)   PT: Wheelchair Mobility 150  feet;Caregiver SBA;manual wheelchair   Interventions   Interventions Quick Adds Therapeutic Activity   Therapeutic Activity   Therapeutic Activities: dynamic activities to improve functional performance Minutes (33006) 17   Symptoms Noted During/After Treatment Fatigue   Treatment Detail/Skilled Intervention Pt supine in bed upon PT arrival. Pt sitting EOB SBA, cued for anterior scooting towards EOB, demonstrates SBA. w/c set up at side of bed, near 45 degree angle from bedside. Pt educated on w/c placement. Pt performed stand-pivot tranfers x2 bed <> w/c with CGA, progressed to SBA, cues on foot placement. Pt performed sit > supine SBA. Pt supine in bed at end of session, alarm on, all needs within reach.   PT Discharge Planning   PT Plan progress IND with bed mob and stand-pivot transfers. w/c mobility in hallway   PT Discharge Recommendation (DC Rec) home with assist   PT Rationale for DC Rec Pt lives in [facility] at baseline, w/c bound and performs stand-pivot transfers IND. Pt is near baseline, able to demonstrate stand-pivot transfer bed <> manual w/c with SBA. Anticipate pt will be safe to return home with same level of assistance.   PT Brief overview of current status SB-CGA with stand-pivot bed <> manual w/c   Total Session Time   Timed Code Treatment Minutes 17   Total Session Time (sum of timed and untimed services) 27

## 2024-06-01 NOTE — ED PROVIDER NOTES
Emergency Department Note      History of Present Illness     Chief Complaint  Abnormal Labs    HPI  Nallely Manrique is a 89 year old female on anticoagulation, with a history of previous DVT, A-fib, iron deficiency anemia, previous episode of diverticulitis, who presented to the emergency department after outside lab workup reportedly found a hemoglobin of 7.  History also obtained from patient's son, he states that when she lived in [place], she was found to have dark stools and thought this was attributed to diverticulitis.  She has never had an endoscopy or colonoscopy.  He reports that she is on the toilet very frequently having dark black stools and she has significantly decreased her eating due to this.  Over the past 2 days she has been significantly more fatigued.  Patient denies any chest pain, abdominal pain, nausea or vomiting.  Does report having dark black stools.    Independent Historian  Son as detailed above.    Review of External Notes  I reviewed the office visit from 12/2023, hemoglobin at that time was 10.6, seen for black runny explosive diarrhea for 4 months  Past Medical History   Relevant Medical History and Problem List  No past medical history on file.    Medications  No current outpatient medications on file.  Xarelto    Surgical History   No past surgical history on file.  Physical Exam   Patient Vitals for the past 24 hrs:   BP Temp Temp src Pulse Resp SpO2 Weight   05/31/24 1931 115/49 97.9  F (36.6  C) Oral 65 16 97 % --   05/31/24 1509 (!) 140/53 98.2  F (36.8  C) Oral 62 16 98 % --   05/31/24 1430 133/52 -- -- 66 -- 96 % --   05/31/24 1330 128/62 -- -- 60 -- -- --   05/31/24 1329 -- -- -- -- -- 98 % --   05/31/24 1300 128/52 -- -- 59 -- 96 % --   05/31/24 1230 129/55 -- -- 58 -- 98 % --   05/31/24 1200 129/50 -- -- 60 -- 97 % --   05/31/24 1130 126/56 -- -- 56 -- 97 % --   05/31/24 1100 120/52 -- -- 58 18 97 % --   05/31/24 1045 126/47 -- -- 54 20 97 % --   05/31/24 0950  138/55 98.3  F (36.8  C) -- 58 18 97 % 53.5 kg (118 lb)     Physical Exam  General: Overall stable and nontoxic appearing  HEENT: Conjunctival pallor, mucous membranes dry   Neuro: Alert, moving all extremities equally with intention  CV: Regular rate and rhythm, radial and DP pulses equal  Respiratory: No signs of respiratory distress, lungs clear to auscultation bilaterally   Abdomen: Soft, without rigidity or rebound throughout  BREN performed with RN as chaperone, black appearing stool   MSK: No lower extremity swelling or tenderness     Diagnostics   Lab Results   Labs Ordered and Resulted from Time of ED Arrival to Time of ED Departure   CBC WITH PLATELETS AND DIFFERENTIAL - Abnormal       Result Value    WBC Count 8.5      RBC Count 2.68 (*)     Hemoglobin 8.1 (*)     Hematocrit 26.5 (*)     MCV 99      MCH 30.2      MCHC 30.6 (*)     RDW 15.5 (*)     Platelet Count 786 (*)     % Neutrophils 67      % Lymphocytes 17      % Monocytes 11      % Eosinophils 3      % Basophils 1      % Immature Granulocytes 1      NRBCs per 100 WBC 0      Absolute Neutrophils 5.8      Absolute Lymphocytes 1.5      Absolute Monocytes 0.9      Absolute Eosinophils 0.3      Absolute Basophils 0.0      Absolute Immature Granulocytes 0.1      Absolute NRBCs 0.0     RETICULOCYTE COUNT - Abnormal    % Reticulocyte 5.2 (*)     Absolute Reticulocyte 0.138 (*)    CBC WITH PLATELETS AND DIFFERENTIAL - Abnormal    WBC Count 8.4      RBC Count 2.53 (*)     Hemoglobin 7.6 (*)     Hematocrit 24.9 (*)     MCV 98      MCH 30.0      MCHC 30.5 (*)     RDW 15.4 (*)     Platelet Count 747 (*)     % Neutrophils 69      % Lymphocytes 17      % Monocytes 9      % Eosinophils 3      % Basophils 1      % Immature Granulocytes 1      NRBCs per 100 WBC 0      Absolute Neutrophils 5.8      Absolute Lymphocytes 1.4      Absolute Monocytes 0.8      Absolute Eosinophils 0.3      Absolute Basophils 0.1      Absolute Immature Granulocytes 0.1      Absolute NRBCs  0.0     RETICULOCYTE COUNT - Abnormal    % Reticulocyte 4.5 (*)     Absolute Reticulocyte 0.113 (*)    CRP INFLAMMATION - Abnormal    CRP Inflammation 40.91 (*)    COMPREHENSIVE METABOLIC PANEL - Normal    Sodium 137      Potassium 4.4      Carbon Dioxide (CO2) 28      Anion Gap 8      Urea Nitrogen 16.8      Creatinine 0.73      GFR Estimate 78      Calcium 9.3      Chloride 101      Glucose 91      Alkaline Phosphatase 128      AST 27      ALT <5      Protein Total 6.8      Albumin 3.7      Bilirubin Total 0.6     OCCULT BLOOD STOOL - Normal    Occult Blood Negative     IRON AND IRON BINDING CAPACITY - Normal    Iron 47      Iron Binding Capacity 278      Iron Sat Index 17     TSH WITH FREE T4 REFLEX - Normal    TSH 1.91     MORPHOLOGY TRACKING   FERRITIN   VITAMIN B12   CALPROTECTIN FECES   TYPE AND SCREEN, ADULT    ABO/RH(D) O NEG      Antibody Screen Negative      SPECIMEN EXPIRATION DATE [id number]     BLOOD MORPHOLOGY PATHOLOGIST REVIEW   ENTERIC BACTERIA AND VIRUS PANEL BY PCR   ABO/RH TYPE AND SCREEN   LAB BLOOD MORPHOLOGY PATHOLOGIST REVIEW       Imaging  CT Head w/o Contrast   Final Result   IMPRESSION:    1. No evidence for acute intracranial hemorrhage, hydrocephalus or   transcortical infarct. No skull fracture.   2. Brain atrophy and advanced presumed sequela of chronic   microvascular ischemic disease.      GEE SYKES DO            SYSTEM ID:  [id number]      Echocardiogram Complete    (Results Pending)       Independent Interpretation  None  ED Course    Medications Administered  Medications   senna-docusate (SENOKOT-S/PERICOLACE) 8.6-50 MG per tablet 1 tablet (has no administration in time range)     Or   senna-docusate (SENOKOT-S/PERICOLACE) 8.6-50 MG per tablet 2 tablet (has no administration in time range)   ondansetron (ZOFRAN ODT) ODT tab 4 mg (has no administration in time range)     Or   ondansetron (ZOFRAN) injection 4 mg (has no administration in time range)   acetaminophen (TYLENOL)  tablet 650 mg (has no administration in time range)     Or   acetaminophen (TYLENOL) Suppository 650 mg (has no administration in time range)   polyethylene glycol (MIRALAX) Packet 17 g (has no administration in time range)   donepezil (ARICEPT) tablet 5 mg (has no administration in time range)   escitalopram (LEXAPRO) tablet 10 mg (has no administration in time range)   gabapentin (NEURONTIN) capsule 200 mg (200 mg Oral $Given 5/31/24 1650)   Lidocaine (LIDOCARE) 4 % Patch 1 patch (has no administration in time range)   metoprolol tartrate (LOPRESSOR) tablet 25 mg (has no administration in time range)   mirabegron (MYRBETRIQ) 24 hr tablet 25 mg (has no administration in time range)   rivaroxaban ANTICOAGULANT (XARELTO) tablet 20 mg ( Oral Automatically Held 6/4/24 0800)   perflutren diluted 1mL to 2mL with saline (OPTISON) diluted injection 9 mL (9 mLs Intravenous $Given 5/31/24 1626)   sodium chloride (PF) 0.9% PF flush 10 mL (20 mLs Intravenous $Given 5/31/24 1627)       Procedures  Procedures     Discussion of Management  Admitting Hospitalist, Tonia Shin PA-C     Social Determinants of Health adding to complexity of care  None    ED Course     Medical Decision Making / Diagnosis   CMS Diagnoses: None    MIPS     None    MDM  Nallely Manrique is a 89 year old female with above history who presented to the emergency department with fatigue and hemoglobin of 7 on outside laboratory workup.  Stable and nontoxic overall, vitals within normal limits.  Hemoglobin here was 8.1 did not require transfusion emergently.  She had no abdominal pain and exam was benign, do not feel that CT imaging was warranted at this time.  She did have black appearing stool grossly on exam although patient is on iron.  As she has been having more fatigue and per her son has been limiting her p.o. intake in the setting of diarrhea, with a hemoglobin on the borderline of requiring transfusion, I did feel that patient would benefit  from admission to the hospital.  She discussed with hospitalist SILVA Shin who kindly accepted patient for admission.     Disposition  The patient was admitted to the hospital.     ICD-10 Codes:    ICD-10-CM    1. Black stool  K92.1       2. Anemia, unspecified type  D64.9       3. Diarrhea, unspecified type  R19.7            Discharge Medications  Current Discharge Medication List            MD Sandeep Ni Connie, MD  05/31/24 1958

## 2024-06-02 LAB
ERYTHROCYTE [DISTWIDTH] IN BLOOD BY AUTOMATED COUNT: 15.3 % (ref 10–15)
GLUCOSE BLDC GLUCOMTR-MCNC: 105 MG/DL (ref 70–99)
HCT VFR BLD AUTO: 25.5 % (ref 35–47)
HGB BLD-MCNC: 7.9 G/DL (ref 11.7–15.7)
MCH RBC QN AUTO: 30.2 PG (ref 26.5–33)
MCHC RBC AUTO-ENTMCNC: 31 G/DL (ref 31.5–36.5)
MCV RBC AUTO: 97 FL (ref 78–100)
PLATELET # BLD AUTO: 745 10E3/UL (ref 150–450)
RBC # BLD AUTO: 2.62 10E6/UL (ref 3.8–5.2)
STFR SERPL-MCNC: 4.9 MG/L
TOTAL PROTEIN SERUM FOR ELP: 5.9 G/DL (ref 6.4–8.3)
WBC # BLD AUTO: 8.8 10E3/UL (ref 4–11)

## 2024-06-02 PROCEDURE — 36415 COLL VENOUS BLD VENIPUNCTURE: CPT | Performed by: HOSPITALIST

## 2024-06-02 PROCEDURE — 85027 COMPLETE CBC AUTOMATED: CPT | Performed by: HOSPITALIST

## 2024-06-02 PROCEDURE — 250N000013 HC RX MED GY IP 250 OP 250 PS 637: Performed by: INTERNAL MEDICINE

## 2024-06-02 PROCEDURE — 99233 SBSQ HOSP IP/OBS HIGH 50: CPT | Performed by: HOSPITALIST

## 2024-06-02 PROCEDURE — 88184 FLOWCYTOMETRY/ TC 1 MARKER: CPT | Performed by: STUDENT IN AN ORGANIZED HEALTH CARE EDUCATION/TRAINING PROGRAM

## 2024-06-02 PROCEDURE — 120N000001 HC R&B MED SURG/OB

## 2024-06-02 PROCEDURE — 88187 FLOWCYTOMETRY/READ 2-8: CPT | Performed by: STUDENT IN AN ORGANIZED HEALTH CARE EDUCATION/TRAINING PROGRAM

## 2024-06-02 PROCEDURE — 88185 FLOWCYTOMETRY/TC ADD-ON: CPT | Performed by: INTERNAL MEDICINE

## 2024-06-02 PROCEDURE — 250N000013 HC RX MED GY IP 250 OP 250 PS 637: Performed by: PHYSICIAN ASSISTANT

## 2024-06-02 PROCEDURE — 88184 FLOWCYTOMETRY/ TC 1 MARKER: CPT | Performed by: INTERNAL MEDICINE

## 2024-06-02 RX ADMIN — GABAPENTIN 200 MG: 100 CAPSULE ORAL at 20:34

## 2024-06-02 RX ADMIN — ACETAMINOPHEN 650 MG: 325 TABLET, FILM COATED ORAL at 16:59

## 2024-06-02 RX ADMIN — LIDOCAINE 1 PATCH: 4 PATCH TOPICAL at 09:48

## 2024-06-02 RX ADMIN — METOPROLOL TARTRATE 25 MG: 25 TABLET, FILM COATED ORAL at 09:49

## 2024-06-02 RX ADMIN — DONEPEZIL HYDROCHLORIDE 5 MG: 5 TABLET, FILM COATED ORAL at 09:49

## 2024-06-02 RX ADMIN — ESCITALOPRAM OXALATE 10 MG: 10 TABLET ORAL at 09:49

## 2024-06-02 RX ADMIN — GABAPENTIN 200 MG: 100 CAPSULE ORAL at 09:49

## 2024-06-02 RX ADMIN — MIRABEGRON 25 MG: 25 TABLET, FILM COATED, EXTENDED RELEASE ORAL at 09:49

## 2024-06-02 RX ADMIN — METOPROLOL TARTRATE 25 MG: 25 TABLET, FILM COATED ORAL at 20:35

## 2024-06-02 RX ADMIN — GABAPENTIN 200 MG: 100 CAPSULE ORAL at 13:36

## 2024-06-02 RX ADMIN — FOLIC ACID 1 MG: 1 TABLET ORAL at 09:49

## 2024-06-02 ASSESSMENT — ACTIVITIES OF DAILY LIVING (ADL)
ADLS_ACUITY_SCORE: 45
ADLS_ACUITY_SCORE: 40
ADLS_ACUITY_SCORE: 45
ADLS_ACUITY_SCORE: 39
ADLS_ACUITY_SCORE: 40
ADLS_ACUITY_SCORE: 46
ADLS_ACUITY_SCORE: 45
ADLS_ACUITY_SCORE: 40
ADLS_ACUITY_SCORE: 45
ADLS_ACUITY_SCORE: 41
ADLS_ACUITY_SCORE: 40
ADLS_ACUITY_SCORE: 40
ADLS_ACUITY_SCORE: 45
ADLS_ACUITY_SCORE: 39
ADLS_ACUITY_SCORE: 40
ADLS_ACUITY_SCORE: 46
ADLS_ACUITY_SCORE: 45
ADLS_ACUITY_SCORE: 45
ADLS_ACUITY_SCORE: 40
ADLS_ACUITY_SCORE: 45
ADLS_ACUITY_SCORE: 45

## 2024-06-02 NOTE — PROGRESS NOTES
A&O. VSS on RA. Tele SR. Lung sounds clear. Lidocaine patch placed on R bicep for pain. Ax1 pivot to chair and commode. Continent of B&B. Reg diet, good appetite. PIV SL.    Plan: Hematology labs pending - monitor daily hgb. TTE tomorrow to reassess aortic stenosis.

## 2024-06-02 NOTE — PLAN OF CARE
PRIMARY Concern: Anemia, diarrhea/melena  SAFETY RISK Concerns (fall risk, behaviors, etc.): Fall risk      Isolation/Type: None   Tests/Procedures for NEXT shift: Labs  Consults? (Pending/following, signed-off?) [facility], hem/onc, PT/SW following  Where is patient from? (Home, TCU, etc.): [facility]  Other Important info for NEXT shift: Patient has own wheelchair in room.   Anticipated DC date & active delays: TBD     SUMMARY NOTE:                Orientation/Cognitive: AxOx4  Observation Goals (Met/ Not Met): Not Met  Mobility Level/Assist Equipment: Ax1 pivot to commode, wheelchair at baseline  Antibiotics & Plan (IV/po, length of tx left): None  Pain Management: PRN Avilable   Tele/VS/O2: VSS on RA  ABNL Lab/BG: Hg=8.0  Diet: Reg  Bowel/Bladder: Cont of B/B,   Skin Concerns: Blanchable redness on bottom, dry heels/feet  Drains/Devices: PIV SL  Patient Stated Goal for Today: rest

## 2024-06-02 NOTE — PROGRESS NOTES
[facility]    Medicine Progress Note - Hospitalist Service    Date of Admission:  5/31/2024    Assessment & Plan   Nallely Manrique is a 89 year old female admitted on 5/31/2024 with a PMH significant for polymyalgia rheumatica, DVT post hip replacement, paroxysmal Afib, HTN, HLD, COPD who presented from [facility] for further evaluation of anemia after having a Hgb 7.1 earlier this week. Note baseline Hgb 8-9 with chronic thrombocytosis dating back to 1/2023. During interview, son actually reports more concern about pt's persistent diarrhea that has been ongoing since 7/2023. Registered under observation status for further evaluation and treatment.     Persistent Diarrhea  Diarrhea since hip replacement in July '23. Prior colonoscopy years ago reported as normal. No known history of IBD. ? Microscopic colitis? CT A/P 12/2023 negative for acute pathology, extensive colonic diverticulosis, no diverticulitis.   - Registered to observation. Consider inpatient admission for anemia and hemolysis as noted below.   - [facility] consulted, appreciate their assistance.    - Enteric panel negative on 5/31/24.  - ESR elevated at 51.  - Calprotectin in process.  - Celiacpanel in process.  - Monitor and record stool output.  - No plans for EGD/colonoscopy for now pending hematology work-up, may consider early next week. [facility] planning to follow up with patient on 6/3/24.    Acute on chronic borderline macrocytic anemia  Chronic thrombocytosis  Chronic melena, but suspect related to iron supplementation  No evidence of active GI bleed, vaginal bleed or hematuria. No hx of PUD, not GERD. On Xarelto with last dose the evening prior to admission. Takes iron supplementation. No abdominal pain, change in appetite. Not on special diet. Prior colonoscopy years ago reported as normal. No known hematologic disorder  *Hemoglobin since admission 8.1 --> 7.6, was down to 7.1 earlier in the week; baseline hemoglobin  dating back to 1/2023 is in 8-9 range.  *Platelets elevated since admission 700-800 range; baseline platelets back to 1/2023 have been 500-900 range.  *Iron studies, TSH normal, folate low normal, B12 normal. Hemoccult negative.  - Peripheral smear pending.  - Haptoglobin low, LDH elevated. Concerning for hemolysis.  - Hemoglobin stable at 7.9 on 6/2/24, recheck in AM.  - [facility] consulted, appreciate their assistance.  - HERB negative on 6/1/24, suggesting non-immune hemolysis.  - STFR slightly elevated.  - Further labs to evaluate etiology of hemolysis pending (PNH, G6PD, SPEP, FLC, JAK2).  - [facility] consulted, appreciate their assistance.  - No plan for EGD/colonoscopy at this time, may consider early next week as noted above.  - Conditional orders for transfusion if hemoglobin < 7.  - PTA Xarelto on hold for now pending further input from consultants.    Mild acute encephalopathy  Cognitive impairment   *Son reports cognitive decline after surgery in July '23, but gradually improved afterward until about a week ago when he noticed worsening memory deficits.  - CT head on 5/31/2024 showed no acute changes, did show brain atrophy and advanced presumed sequelae of chronic microvascular ischemic disease.    - Continue PTA Aricept.  - Consult OT for cognitive evaluation.    Cardiac murmur  Aortic stenosis  Noted systolic murmur in the aortic region. Patient reports she has had this for years. No recent cardiac symptoms. No prior echo available in epic.  - TTE on 6/1/2024 showed LVEF 65 to 70%, mild to moderate tricuspid regurg, mild pulmonary hypertension, at least mild aortic stenosis - see report in epic.  - Plan for repeat limited TTE on 6/3/2024 to confirm degree of aortic stenosis. ? aortic stenosis potentially contributing to hemolysis?    Generalized Weakness  Reports recent fatigue. Wheelchair bound, transfers herself to the toilet.   - PT consulted, appreciate their assistance.  - SW for discharge  planning, appreciate their assistance.  BASELINE PRIOR TO ADMISSION  1.  Living Situation:  [facility], [facility]  2. Ambulation:  wheelchair  3.  Mental status:  alert and oriented X4  4. Diet: regular   5.  Family contact:  Extended Emergency Contact Information  Primary Emergency Contact: Cody Manrique  Mobile Phone: [phone]  Relation: Son     Depression  - Continue PTA Lexapro.    Paroxsymal Atrial Fibrillation  History of DVT post surgery  *Recent LE US negative.  - PTA Xarelto held as noted above, defer to hematology/GI regarding timing of restarting.  - Continue PTA Metoprolol with hold parameters.    PMR  - No acute issues. Baseline chronic hip pain. Not on steroids.           Diet: Regular Diet Adult    DVT Prophylaxis: Pneumatic Compression Devices  Arguelles Catheter: Not present  Lines: None     Cardiac Monitoring: ACTIVE order. Indication: Tachyarrhythmias, acute (48 hours)  Code Status: No CPR- Do NOT Intubate      Clinically Significant Risk Factors Present on Admission               # Drug Induced Coagulation Defect: home medication list includes an anticoagulant medication             # Financial/Environmental Concerns: none         Disposition Plan     Medically Ready for Discharge: Anticipated in 2-4 Days             Charli Cartwright MD  Hospitalist Service  [facility]  Securely message with [employer] (more info)  Text page via [facility] Paging/Directory   ______________________________________________________________________    Interval History   Nallely Manrique was seen this morning and then again this afternoon.  She feels okay.  No new complaints/concerns.  Having 1-2 soft/loose bowel movements per day, no melena or hematochezia.  No nausea or abdominal pain.  Denies fevers, lightheadedness, chest pain, shortness of breath.  She was seen again this afternoon when her son Cody was in the room with her.  Discussed plan of care including workup for etiology of hemolysis,  plan for repeat echo, plan for GI follow-up tomorrow.  He notes that her memory has been noticeably worse over the past week or so.    Physical Exam   Vital Signs: Temp: 97.6  F (36.4  C) Temp src: Oral BP: 119/55 Pulse: 57   Resp: 18 SpO2: 97 % O2 Device: None (Room air)    Weight: 122 lbs 2.16 oz    Constitutional: awake, alert, cooperative, no apparent distress, laying in the hospital bed  Respiratory: no increased work of breathing, clear to auscultation bilaterally, no crackles or wheezing  Cardiovascular: regular rate and rhythm, systolic murmur noted  GI: bowel sounds positive, soft, non-distended, non-tender  Skin: warm, dry  Musculoskeletal: no lower extremity pitting edema present  Neurologic: awake, alert, answers questions appropriately, moves all extremities    Medical Decision Making       55 MINUTES SPENT BY ME on the date of service doing chart review, history, exam, documentation & further activities per the note.      Data     I have personally reviewed the following data over the past 24 hrs:    8.8  \   7.9 (L)   / 745 (H)     N/A N/A N/A /  105 (H)   N/A N/A N/A \

## 2024-06-02 NOTE — PROGRESS NOTES
PRIMARY Concern: Anemia, diarrhea/melena  SAFETY RISK Concerns (fall risk, behaviors, etc.): Fall risk      Isolation/Type: None   Tests/Procedures for NEXT shift: Labs  Consults? (Pending/following, signed-off?) [facility], hem/onc, PT/SW following  Where is patient from? (Home, TCU, etc.): [facility]  Other Important info for NEXT shift: Patient has own wheelchair in room.   Anticipated DC date & active delays: TBD    SUMMARY NOTE:                Orientation/Cognitive: AxOx4  Observation Goals (Met/ Not Met): Not Met  Mobility Level/Assist Equipment: Ax1 pivot to commode, wheelchair at baseline  Antibiotics & Plan (IV/po, length of tx left): None  Pain Management: PRN tylenol given x1 for shoulder pain  Tele/VS/O2: VSS on RA  ABNL Lab/BG: Hg=8.0  Diet: Reg  Bowel/Bladder: Cont of B/B  Skin Concerns: Blanchable redness on bottom, dry heels/feet  Drains/Devices: PIV SL  Patient Stated Goal for Today: rest

## 2024-06-02 NOTE — CONSULTS
Care Management Initial Consult    General Information  Assessment completed with: Patient, VM-chart review, patient  Type of CM/SW Visit: Initial Assessment    Primary Care Provider verified and updated as needed: Yes (per resident face sheet, PCP is Calvin Young PA-C with [facility])   Readmission within the last 30 days: no previous admission in last 30 days      Reason for Consult: discharge planning  Advance Care Planning:    no ACP documents on file in EPIC  - likely has POLST at [facility], copy to be obtained     Communication Assessment  Patient's communication style: spoken language (English or Bilingual)    Hearing Difficulty or Deaf: yes   Wear Glasses or Blind: yes    Cognitive  Cognitive/Neuro/Behavioral: WDL     Arousal Level: opens eyes spontaneously  Orientation: oriented x 4  Mood/Behavior: calm, cooperative  Best Language: 0 - No aphasia  Speech: clear, spontaneous    Living Environment:   People in home: facility resident     Current living Arrangements: assisted living ([facility] phone: [phone])  Name of Facility: [facility] [id number]  [facility] [address])   Able to return to prior arrangements: yes     Family/Social Support:  Care provided by: other (see comments) ([facility] staff)  Provides care for: no one  Marital Status:   Support system: Children          Description of Support System: Supportive, Involved    Support Assessment: Adequate family and caregiver support    Current Resources:   Patient receiving home care services:  (TBD)  Community Resources:  ([facility] services)  Equipment currently used at home: wheelchair, manual  Supplies currently used at home:  (TBD)    Employment/Financial:  Employment Status: retired     Financial Concerns: none   Finance Comments: active MEDICARE/MEDICARE and COMMERCIAL/AARP insurance  Does the patient's insurance plan have a 3 day qualifying hospital stay waiver?  No    Lifestyle & Psychosocial  "Needs:  Interpersonal Safety: Low Risk  (12/21/2023)    Interpersonal Safety     Do you feel physically and emotionally safe where you currently live?: Yes     Within the past 12 months, have you been hit, slapped, kicked or otherwise physically hurt by someone?: No     Within the past 12 months, have you been humiliated or emotionally abused in other ways by your partner or ex-partner?: No     Functional Status:  Prior to admission patient needed assistance:   Assesssment of Functional Status:  (see therapy assessment 6/1)  Recommendation: home with assist   Rationale: Pt lives in detention at baseline, w/c bound and performs stand-pivot transfers IND. Pt is near baseline, able to demonstrate stand-pivot transfer bed <> manual w/c with SBA. Anticipate pt will be safe to return home with same level of assistance.     Mental Health Status:  Mental Health Status: No Current Concerns (known memory loss)       Chemical Dependency Status:  Chemical Dependency Status: No Current Concerns           Values/Beliefs:  Spiritual, Cultural Beliefs, Jew Practices, Values that affect care: no         Additional Information:  Met with patient in room, introduced self and role in discharge planning. Confirmed the information in the above assessment, please see each section for helpful details. Pt is admitted here to [facility] with persistent diarrhea, presents from detention.  When asked to confirm face sheet address, pt states \"that is my son's address ([address]); I live in [place].\"  Later pt corrected self, \"I live in a senior complex and have some help.\"  Pt could not name her PCP.  GREGORYRN previously attempted to reach detention staff but was told no staff available until Monday.      GREGORYRN reviewed Resident Record-Face Sheet from pt's [facility], which does indicate answers to these questions;     In what kind of facility does pt reside?  Facility Type: detention (assisted living) - \"home " "discharge\"  address: [address] [place] [address]  [address]: [id number]     2. Is resident enrolled in any services?  physical assist (\"evacuation status level 3 - resident is non-ambulatory and requires frequent supportive nursing care and observation\"), wears call pendant bracelet, pt states \"I go down for meals,\" face sheet indicates [facility] pharmacy used so has med administration; desk confirms \"she is in the Assisted Living.\"   Is the resident seen by their PCP on-site or off-site?  YAN is an on-site provider  Does pt have any personal DME (describe)? Yes--has own manual wheelchair  Facility contact per facesheet: Phone [phone]  Nursing Fax:  [phone]  What does the facility need faxed to them from us at discharge?  anticipate MD Orders, MAR, H&P, discharge summary, consults, therapy notes  Does facility manage medications?   Yes If yes, contracted pharmacy? Name:  [facility] [place], [phone] / fax [phone]    If new Rx meds at discharge, fill at hospital pharmacy or contracted pharmacy?   TBD   Will need POLST & Health Care Directive documents to be faxed to Short Stay [place] fax #[phone]  or email directly to [email or web address]   What is the preferred return time for the resident?  unknown, left VM for DON        Will route H&P and therapy note to [facility] contact info below.  Will continue to follow for discharge planning.       Breanna Johnson RN, BSN, PHN  [facility]  Inpatient Care Management - FLOAT  Short Stay [facility] RN Mobile: [phone] daily 7:30-4:00      "

## 2024-06-03 ENCOUNTER — APPOINTMENT (OUTPATIENT)
Dept: OCCUPATIONAL THERAPY | Facility: CLINIC | Age: 89
DRG: 812 | End: 2024-06-03
Attending: HOSPITALIST
Payer: MEDICARE

## 2024-06-03 ENCOUNTER — APPOINTMENT (OUTPATIENT)
Dept: CARDIOLOGY | Facility: CLINIC | Age: 89
DRG: 812 | End: 2024-06-03
Attending: HOSPITALIST
Payer: MEDICARE

## 2024-06-03 ENCOUNTER — APPOINTMENT (OUTPATIENT)
Dept: PHYSICAL THERAPY | Facility: CLINIC | Age: 89
DRG: 812 | End: 2024-06-03
Payer: MEDICARE

## 2024-06-03 LAB
ALBUMIN SERPL BCG-MCNC: 3.3 G/DL (ref 3.5–5.2)
ALBUMIN SERPL ELPH-MCNC: 3.1 G/DL (ref 3.7–5.1)
ALBUMIN UR-MCNC: 30 MG/DL
ALP SERPL-CCNC: 119 U/L (ref 40–150)
ALPHA1 GLOB SERPL ELPH-MCNC: 0.5 G/DL (ref 0.2–0.4)
ALPHA2 GLOB SERPL ELPH-MCNC: 0.5 G/DL (ref 0.5–0.9)
ALT SERPL W P-5'-P-CCNC: <5 U/L (ref 0–50)
ANION GAP SERPL CALCULATED.3IONS-SCNC: 9 MMOL/L (ref 7–15)
APPEARANCE UR: ABNORMAL
AST SERPL W P-5'-P-CCNC: 18 U/L (ref 0–45)
B-GLOBULIN SERPL ELPH-MCNC: 0.8 G/DL (ref 0.6–1)
BACTERIA #/AREA URNS HPF: ABNORMAL /HPF
BILIRUB SERPL-MCNC: 0.4 MG/DL
BILIRUB UR QL STRIP: NEGATIVE
BUN SERPL-MCNC: 13.6 MG/DL (ref 8–23)
CALCIUM SERPL-MCNC: 8.7 MG/DL (ref 8.8–10.2)
CALPROTECTIN STL-MCNT: 48.4 MG/KG (ref 0–49.9)
CHLORIDE SERPL-SCNC: 101 MMOL/L (ref 98–107)
COLOR UR AUTO: YELLOW
CREAT SERPL-MCNC: 0.6 MG/DL (ref 0.51–0.95)
DEPRECATED HCO3 PLAS-SCNC: 26 MMOL/L (ref 22–29)
EGFRCR SERPLBLD CKD-EPI 2021: 85 ML/MIN/1.73M2
ERYTHROCYTE [DISTWIDTH] IN BLOOD BY AUTOMATED COUNT: 15.1 % (ref 10–15)
G6PD RBC-CCNT: 15.7 U/G HB
GAMMA GLOB SERPL ELPH-MCNC: 1 G/DL (ref 0.7–1.6)
GLIADIN IGA SER-ACNC: 3.3 U/ML
GLIADIN IGG SER-ACNC: <0.6 U/ML
GLUCOSE BLDC GLUCOMTR-MCNC: 94 MG/DL (ref 70–99)
GLUCOSE SERPL-MCNC: 94 MG/DL (ref 70–99)
GLUCOSE UR STRIP-MCNC: NEGATIVE MG/DL
HCT VFR BLD AUTO: 26.4 % (ref 35–47)
HGB BLD-MCNC: 8.4 G/DL (ref 11.7–15.7)
HGB UR QL STRIP: ABNORMAL
HYALINE CASTS: 7 /LPF
IGA SERPL-MCNC: 383 MG/DL (ref 84–499)
KAPPA LC FREE SER-MCNC: 2.85 MG/DL (ref 0.33–1.94)
KAPPA LC FREE/LAMBDA FREE SER NEPH: 1.27 {RATIO} (ref 0.26–1.65)
KETONES UR STRIP-MCNC: NEGATIVE MG/DL
LAMBDA LC FREE SERPL-MCNC: 2.24 MG/DL (ref 0.57–2.63)
LEUKOCYTE ESTERASE UR QL STRIP: ABNORMAL
LVEF ECHO: NORMAL
M PROTEIN SERPL ELPH-MCNC: 0 G/DL
MCH RBC QN AUTO: 30.9 PG (ref 26.5–33)
MCHC RBC AUTO-ENTMCNC: 31.8 G/DL (ref 31.5–36.5)
MCV RBC AUTO: 97 FL (ref 78–100)
MUCOUS THREADS #/AREA URNS LPF: PRESENT /LPF
NITRATE UR QL: NEGATIVE
PATH REPORT.COMMENTS IMP SPEC: ABNORMAL
PATH REPORT.COMMENTS IMP SPEC: NORMAL
PATH REPORT.FINAL DX SPEC: NORMAL
PATH REPORT.FINAL DX SPEC: NORMAL
PATH REPORT.MICROSCOPIC SPEC OTHER STN: NORMAL
PATH REPORT.MICROSCOPIC SPEC OTHER STN: NORMAL
PATH REPORT.RELEVANT HX SPEC: NORMAL
PATH REPORT.RELEVANT HX SPEC: NORMAL
PH UR STRIP: 6 [PH] (ref 5–7)
PLATELET # BLD AUTO: 717 10E3/UL (ref 150–450)
POTASSIUM SERPL-SCNC: 4.1 MMOL/L (ref 3.4–5.3)
PROT PATTERN SERPL ELPH-IMP: ABNORMAL
PROT SERPL-MCNC: 6.4 G/DL (ref 6.4–8.3)
RBC # BLD AUTO: 2.72 10E6/UL (ref 3.8–5.2)
RBC URINE: 10 /HPF
SODIUM SERPL-SCNC: 136 MMOL/L (ref 135–145)
SP GR UR STRIP: 1.03 (ref 1–1.03)
SQUAMOUS EPITHELIAL: 10 /HPF
TTG IGA SER-ACNC: 1 U/ML
TTG IGG SER-ACNC: <0.6 U/ML
UROBILINOGEN UR STRIP-MCNC: NORMAL MG/DL
WBC # BLD AUTO: 8.2 10E3/UL (ref 4–11)
WBC CLUMPS #/AREA URNS HPF: PRESENT /HPF
WBC URINE: >182 /HPF

## 2024-06-03 PROCEDURE — 250N000013 HC RX MED GY IP 250 OP 250 PS 637: Performed by: PHYSICIAN ASSISTANT

## 2024-06-03 PROCEDURE — 80053 COMPREHEN METABOLIC PANEL: CPT | Performed by: HOSPITALIST

## 2024-06-03 PROCEDURE — 255N000002 HC RX 255 OP 636: Performed by: HOSPITALIST

## 2024-06-03 PROCEDURE — 250N000013 HC RX MED GY IP 250 OP 250 PS 637: Performed by: HOSPITALIST

## 2024-06-03 PROCEDURE — 93308 TTE F-UP OR LMTD: CPT | Mod: 26 | Performed by: INTERNAL MEDICINE

## 2024-06-03 PROCEDURE — 99232 SBSQ HOSP IP/OBS MODERATE 35: CPT | Performed by: HOSPITALIST

## 2024-06-03 PROCEDURE — 99233 SBSQ HOSP IP/OBS HIGH 50: CPT | Performed by: INTERNAL MEDICINE

## 2024-06-03 PROCEDURE — 97535 SELF CARE MNGMENT TRAINING: CPT | Mod: GO

## 2024-06-03 PROCEDURE — 85027 COMPLETE CBC AUTOMATED: CPT | Performed by: HOSPITALIST

## 2024-06-03 PROCEDURE — 97542 WHEELCHAIR MNGMENT TRAINING: CPT | Mod: GP

## 2024-06-03 PROCEDURE — 97530 THERAPEUTIC ACTIVITIES: CPT | Mod: GP

## 2024-06-03 PROCEDURE — 250N000013 HC RX MED GY IP 250 OP 250 PS 637: Performed by: INTERNAL MEDICINE

## 2024-06-03 PROCEDURE — 999N000208 ECHOCARDIOGRAM LIMITED

## 2024-06-03 PROCEDURE — 97165 OT EVAL LOW COMPLEX 30 MIN: CPT | Mod: GO

## 2024-06-03 PROCEDURE — 87086 URINE CULTURE/COLONY COUNT: CPT | Performed by: HOSPITALIST

## 2024-06-03 PROCEDURE — 93325 DOPPLER ECHO COLOR FLOW MAPG: CPT | Mod: 26 | Performed by: INTERNAL MEDICINE

## 2024-06-03 PROCEDURE — 93321 DOPPLER ECHO F-UP/LMTD STD: CPT | Mod: 26 | Performed by: INTERNAL MEDICINE

## 2024-06-03 PROCEDURE — 120N000001 HC R&B MED SURG/OB

## 2024-06-03 PROCEDURE — 36415 COLL VENOUS BLD VENIPUNCTURE: CPT | Performed by: HOSPITALIST

## 2024-06-03 PROCEDURE — 81001 URINALYSIS AUTO W/SCOPE: CPT | Performed by: HOSPITALIST

## 2024-06-03 RX ADMIN — MIRABEGRON 25 MG: 25 TABLET, FILM COATED, EXTENDED RELEASE ORAL at 09:31

## 2024-06-03 RX ADMIN — FOLIC ACID 1 MG: 1 TABLET ORAL at 09:32

## 2024-06-03 RX ADMIN — GABAPENTIN 200 MG: 100 CAPSULE ORAL at 13:26

## 2024-06-03 RX ADMIN — ACETAMINOPHEN 650 MG: 325 TABLET, FILM COATED ORAL at 14:48

## 2024-06-03 RX ADMIN — GABAPENTIN 200 MG: 100 CAPSULE ORAL at 20:46

## 2024-06-03 RX ADMIN — DONEPEZIL HYDROCHLORIDE 5 MG: 5 TABLET, FILM COATED ORAL at 09:31

## 2024-06-03 RX ADMIN — ESCITALOPRAM OXALATE 10 MG: 10 TABLET ORAL at 09:32

## 2024-06-03 RX ADMIN — RIVAROXABAN 20 MG: 20 TABLET, FILM COATED ORAL at 18:16

## 2024-06-03 RX ADMIN — LIDOCAINE 1 PATCH: 4 PATCH TOPICAL at 09:35

## 2024-06-03 RX ADMIN — METOPROLOL TARTRATE 25 MG: 25 TABLET, FILM COATED ORAL at 09:31

## 2024-06-03 RX ADMIN — GABAPENTIN 200 MG: 100 CAPSULE ORAL at 09:30

## 2024-06-03 RX ADMIN — HUMAN ALBUMIN MICROSPHERES AND PERFLUTREN 9 ML: 10; .22 INJECTION, SOLUTION INTRAVENOUS at 10:02

## 2024-06-03 RX ADMIN — METOPROLOL TARTRATE 25 MG: 25 TABLET, FILM COATED ORAL at 20:46

## 2024-06-03 ASSESSMENT — ACTIVITIES OF DAILY LIVING (ADL)
ADLS_ACUITY_SCORE: 47
ADLS_ACUITY_SCORE: 45
ADLS_ACUITY_SCORE: 43
ADLS_ACUITY_SCORE: 45
ADLS_ACUITY_SCORE: 47
ADLS_ACUITY_SCORE: 47
ADLS_ACUITY_SCORE: 45
ADLS_ACUITY_SCORE: 47
ADLS_ACUITY_SCORE: 43
ADLS_ACUITY_SCORE: 45
ADLS_ACUITY_SCORE: 45
ADLS_ACUITY_SCORE: 47
ADLS_ACUITY_SCORE: 43
ADLS_ACUITY_SCORE: 47
ADLS_ACUITY_SCORE: 45
ADLS_ACUITY_SCORE: 45
ADLS_ACUITY_SCORE: 47

## 2024-06-03 NOTE — PROGRESS NOTES
06/03/24 0858   Appointment Info   Signing Clinician's Name / Credentials (OT) Edgard Miguel A, OTR/L   Living Environment   People in Home alone   Current Living Arrangements assisted living   Home Accessibility no concerns   Transportation Anticipated family or friend will provide   Self-Care   Usual Activity Tolerance fair   Current Activity Tolerance fair   Equipment Currently Used at Home wheelchair, manual   Activity/Exercise/Self-Care Comment Pt is IND with dressing and toileting, w/c bound and performs stand pivot transfers IND. Has assistance with showers, meals, medications, and housekeeping.   General Information   Onset of Illness/Injury or Date of Surgery 05/31/24   Referring Physician Charli Cartwright MD   Patient/Family Therapy Goal Statement (OT) Get better   Additional Occupational Profile Info/Pertinent History of Current Problem Nallely Manrique is a 89 year old female admitted on 5/31/2024 with a PMH significant for polymyalgia rheumatica, DVT post hip replacement, paroxysmal Afib, HTN, HLD, COPD who presented from [facility] for further evaluation of anemia after having a Hgb 7.1 earlier this week. Note baseline Hgb 8-9 with chronic thrombocytosis dating back to 1/2023. During interview, son actually reports more concern about pt's persistent diarrhea that has been ongoing since 7/2023. Registered under observation status for further evaluation and treatment.   Existing Precautions/Restrictions fall   Limitations/Impairments safety/cognitive   Cognitive Status Examination   Orientation Status orientation to person, place and time   Follows Commands follows one-step commands;75-90% accuracy;verbal cues/prompting required   Cognitive Screens/Assessments   Cognitive Assessments Completed [facility] Mental Status Exam (UMS):  Total Score out of /30 19   [facility] Interpretation The UMS ([facility] Mental Status Exam) is a 30-point standardized cognitive  screen used to identify the presence of cognitive deficits and/or to identify a change in cognition over time.  This screen assesses cognitive abilities in various domains.  ([email or web address])     Patient's performance was as follows:    Orientation and Attention: 3/3  Memory: Delayed Recall with Interference: 5  Calculation and Registration: 3/3  Category Naming with Time Contraint: 3  Registration and Digit Span: 12  Clock drawin  Visual Spatial: 2/2  Story Recall with Executive Function:      Total Score: /30     Score Interpretation: Score places patient in the dementia category.  Patient demonstrates deficits in the following areas: attention, delayed recall, executive functions, clock drawing, and orientation).  Please note that this examination is used to screen individuals to look for the presence of cognitive deficits and to identify changes in cognition over time.  This is not a diagnosis.  This examination can be followed by further cognitive assessments if appropriate and deemed necessary.   Visual Perception   Visual Impairment/Limitations WFL   Sensory   Sensory Comments denies n/t   Pain Assessment   Patient Currently in Pain Yes, see Vital Sign flowsheet  (pain with movement in R shoulder)   Range of Motion Comprehensive   Comment, General Range of Motion B UE WFL. R shoulder w/ mild AROM deficits.   Strength Comprehensive (MMT)   Comment, General Manual Muscle Testing (MMT) Assessment generalized weakness noted with functional mobility   Bed Mobility   Bed Mobility sit-supine;scooting/bridging   Scooting/Bridging [place] (Bed Mobility) supervision   Sit-Supine [place] (Bed Mobility) supervision   Transfers   Transfers sit-stand transfer;bed-chair transfer;toilet transfer   Transfer Skill: Bed to Chair/Chair to Bed   Bed-Chair [place] (Transfers) supervision   Sit-Stand Transfer   Sit-Stand [place] (Transfers) supervision   Toilet Transfer   [place] Level (Toilet  Transfer) minimum assist (75% patient effort)   Balance   Balance Comments Appears unsteady, defer to PT   Activities of Daily Living   BADL Assessment/Intervention lower body dressing;toileting;grooming;feeding   Lower Body Dressing Assessment/Training   [place] Level (Lower Body Dressing) contact guard assist   Grooming Assessment/Training   [place] Level (Grooming) supervision   Eating/Self Feeding   [place] Level (Feeding) supervision   Toileting   [place] Level (Toileting) minimum assist (75% patient effort)   Clinical Impression   Criteria for Skilled Therapeutic Interventions Met (OT) Yes, treatment indicated   OT Diagnosis Decline in function   OT Problem List-Impairments impacting ADL problems related to;activity tolerance impaired;balance;cognition;mobility;range of motion (ROM);strength;pain   Assessment of Occupational Performance 3-5 Performance Deficits   Identified Performance Deficits dressing, bathing, toileting, functional mobility   Planned Therapy Interventions (OT) ADL retraining;cognition;transfer training;strengthening;progressive activity/exercise   Clinical Decision Making Complexity (OT) problem focused assessment/low complexity   Risk & Benefits of therapy have been explained evaluation/treatment results reviewed;care plan/treatment goals reviewed;risks/benefits reviewed;current/potential barriers reviewed;participants voiced agreement with care plan;participants included;patient   OT Total Evaluation Time   OT Eval, Low Complexity Minutes (31141) 15   OT Goals   Therapy Frequency (OT) 5 times/week   OT Predicted Duration/Target Date for Goal Attainment 06/10/24   OT Goals Hygiene/Grooming;Upper Body Dressing;Lower Body Dressing;Toilet Transfer/Toileting;OT Goal 1   OT: Hygiene/Grooming modified independent;from wheelchair   OT: Upper Body Dressing Modified independent;including set-up/clothing retrieval;from wheelchair   OT: Lower Body Dressing Supervision/stand-by  assist;including set-up/clothing retrieval;from wheelchair   OT: Toilet Transfer/Toileting Supervision/stand-by assist;toilet transfer;cleaning and garment management;using adaptive equipment   OT: Goal 1 Pt will demonstrate shower transfer with CGA and use of AE.   Self-Care/Home Management   Self-Care/Home Mgmt/ADL, Compensatory, Meal Prep Minutes (66354) 23   Symptoms Noted During/After Treatment (Meal Preparation/Planning Training) fatigue   Treatment Detail/Skilled Intervention Provided education to pt on SLUMS results and implications on I/ADL independence. Recommended continued assist w/ all higher level IADL. Pt completes stand pivot from EOB to chair w/ close SBA and no AD. VCs to control descent w/ sit. Stand pivot chair <> W/c x3 throughout session and pt progressing to SBA. Cued to use w/c brakes prior to stand once. navigates in w/c to bathroom to prepare for toilet transfer SBA. Requires min A and cues for body position w/ toilet tranfer and garment mgmt. Assist needed for boost off of toilet as pt w/ decreasesd strength. Pt sitting back to EOB from chair with CGA, cues to fully pivot prior to sitting. SBA to transfer supine and VCs/gestures for pt to position self at center and head of bed. Alarm activated and RN present at OT departure.   OT Discharge Planning   OT Plan w/c to bathroom for toilet transfer/toileting, LB dressing w/ retrieval   OT Discharge Recommendation (DC Rec) home with assist;home with home care occupational therapy;Transitional Care Facility   OT Rationale for DC Rec The patient presents below baseline with weakness, cognitive deficits, and decreased I/ADL independence. The patient would benefit from TCU at discharge to progress strength and independence with ADL of dressing and bathing. If discharging back to [facility], recommend Ax1 with I/ADL and stand pivot transfers. Recommend HHOT to progress ADL independence.   Total Session Time   Timed Code Treatment Minutes 23   Total Session  Time (sum of timed and untimed services) 38

## 2024-06-03 NOTE — PROGRESS NOTES
"Care Management Follow Up    Length of Stay (days): 2    Expected Discharge Date: 06/04/2024     Concerns to be Addressed: discharge planning     Patient plan of care discussed at interdisciplinary rounds: Yes    Anticipated Discharge Disposition: TCU     Anticipated Discharge Services: None  Anticipated Discharge DME: Wheelchair (has own from home here)    Patient/family educated on Medicare website which has current facility and service quality ratings: no  Education Provided on the Discharge Plan: Yes  Patient/Family in Agreement with the Plan:      Referrals Placed by CM/SW:    Private pay costs discussed: Not applicable    Additional Information:  Spoke with patient regarding PT/OT recommendation for TCU at discharge. Pt stating \"well this would be difficult for my son\" referring to son having to drive patient around. Explained TCU purpose, that it is an inpatient setting, amount of therapy received, and provided with SNF list for choices. Pt asking SW to call son Cody. Spoke with Cody, who states pt had been to a TCU in [place] after a hip surgery, so he is familiar with what that is. He will be coming to the hospital and will look at the TCU list left in pt's room. Requested Cody indicate 3 or more choices for referrals. Provided information on insurance coverage and Medicare's requirements. Cody stated he will [place] [facility] [place] the choices on the list and leave it in pt's room or will call the SW phone number provided on the list.      TAWANA Mcleod  Social Work  [employer]    "

## 2024-06-03 NOTE — PROGRESS NOTES
GASTROENTEROLOGY PROGRESS NOTE    SUBJECTIVE:  Feels like diarrhea has gotten better. Only 1 stool this morning - formed, small, no melena or hematochezia. Tolerating regular diet. Denies n/v.     OBJECTIVE:    /56 (BP Location: Left arm)   Pulse 62   Temp 98.5  F (36.9  C) (Oral)   Resp 16   Wt 52.1 kg (114 lb 13.8 oz)   SpO2 98%   BMI 19.11 kg/m    Temp (24hrs), Av.2  F (36.8  C), Min:97.8  F (36.6  C), Max:99.2  F (37.3  C)    Patient Vitals for the past 72 hrs:   Weight   24 0527 52.1 kg (114 lb 13.8 oz)   24 0314 55.4 kg (122 lb 2.2 oz)       Intake/Output Summary (Last 24 hours) at 2024 0800  Last data filed at 2024 1850  Gross per 24 hour   Intake 240 ml   Output --   Net 240 ml         PHYSICAL EXAM    Constitutional: NAD, comfortable  Cardiovascular: RRR  Respiratory: CTAB  Abdomen: soft, non-tender, nondistended.    Additional Comments:  ROS, FH, SH: See initial GI consult for details.    I have reviewed the patient's new clinical lab results:    Recent Labs   Lab Test 24  0658 24  0633 24  06   WBC 8.2 8.8 7.7   HGB 8.4* 7.9* 8.0*   MCV 97 97 97   * 745* 794*     Recent Labs   Lab Test 24  0658 24  0629 24  1037    138 137   POTASSIUM 4.1 4.3 4.4   CHLORIDE 101 102 101   CO2 26 27 28   BUN 13.6 12.3 16.8   CR 0.60 0.65 0.73   ANIONGAP 9 9 8   DANNY 8.7* 8.8 9.3     Recent Labs   Lab Test 24  0658 24  1037 23  1103   ALBUMIN 3.3* 3.7 3.7   BILITOTAL 0.4 0.6 0.4   ALT <5 <5 7   AST 18 27 19   ALKPHOS 119 128 123         Assessment:  89 year old female with past medical history significant for polymyalgia rheumatica, DVT post hip replacement, paroxysmal atrial fibrillation on Xarelto (currently held), hypertension, hyperlipidemia, and COPD admitted  for acute on chronic anemia and persistent, chronic diarrhea.     1. Acute on chronic anemia. Normocytic. Baseline HGB 8-9. No overt GI bleeding. FOBT  negative. HGB on admit 8.1, dropped to 7.6 5/31 but now improving without transfusion. HGB this am 8.4. Has thrombocytosis as well. Hem/onc following and workup pending for causes of anemia, possibly hemolysis. Low suspicion for GI blood loss. Xarelto on hold.    2. Chronic diarrhea since July 2023.  Unclear etiology. Considerations include, microscopic colitis, medication induced, less likely IBD, infection, or malignancy. Has elevated CRP in the context of PMR. Donepezil can cause diarrhea.  Enteric panel is negative for infection.  Fecal calprotectin pending. CT unremarkable, extensive diverticulosis. Last colonoscopy reported by patient to be years ago without any findings.     Diarrhea resolved here without antidiarrheals.     Plan:  --No urgent need for EGD or colonoscopy.   --Monitor stool output for diarrhea, melena, hematochezia.   --Diet as tolerated.   --Ok to start anticoagulation from GI standpoint.   --If hematology prefers GI evaluation for anemia, this can be pursued at some point.   --Await fecal calprotectin results.    Discussed with Dr. Young.    Daya Davis, PAC  [facility] ([facility])

## 2024-06-03 NOTE — PLAN OF CARE
PRIMARY Concern: Pt here with c/o of diarrhea, black stools and low Hgb-7.1  SAFETY RISK Concerns (fall risk, behaviors, etc.): Fall risk      Isolation/Type: None   Tests/Procedures for NEXT shift: AM labs  Consults? (Pending/following, signed-off?) GI, hem/onc, PT/SW following, neurology and nutrition consult  Where is patient from? (Home, TCU, etc.): [facility]  Other Important info for NEXT shift: Patient own wheelchair is in room. Echo completed EF 60-65%   Anticipated DC date & active delays: TBD-- PT rec TCU    SUMMARY NOTE:                Orientation/Cognitive: A/Ox4, [place]  Observation Goals (Met/ Not Met): Inpt   Mobility Level/Assist Equipment: Ax1-2 pivot to BSC/chair, uses wheelchair at baseline  Antibiotics & Plan (IV/po, length of tx left):n/a  Pain Management: R shoulder pain-- schd lidocaine patch/gabapentin and PRN tylenol x1   Tele/VS/O2: VSS RA,Tele SR  ABNL Lab/BG: Hgb 7.9>8.4, UC pending  Diet: Reg  Bowel/Bladder: Cont of B&B-- 2 BM soft/brown today  Skin Concerns: Blanchable redness on bottom-- applied mepi, dry heels/feet  Drains/Devices: PIV SL  Patient Stated Goal for Today: rest

## 2024-06-03 NOTE — PROGRESS NOTES
[facility]  Hematology-Oncology Follow-up Note      Today's Date: 06/03/24  Date of Admission:  5/31/2024  Reason for Consult: Severe normocytic anemia and thrombocytosis      ASSESSMENT:  Nallely Manrique is a 89 year old female with past medical history of polymyalgia rheumatica, DVT post pelvis replacement, paroxysmal atrial fibrillation, hypertension, dyslipidemia, COPD has been admitted with severe anemia needing blood transfusion.    Patient had extensive workup done for anemia during this admission.  Unfortunately there is note of unclear cause for anemia.  I feel this is multifactorial.  Her iron panel reveals elevated ferritin with low normal iron, TIBC and percent saturation.  This is more consistent with anemia of chronic disease rather than true iron deficiency.  She does have thrombocytosis which can be reactive to some other inflammatory state or possibly blood loss (with or without iron deficiency).    Recent Labs   Lab Test 05/31/24  2313 05/31/24  1037   ELLIS  --  516*   IRON  --  47   FEB  --  278   IRONSAT  --  17   STRE 4.9*  --      She has normal vitamin B12 and folic acid levels.    Folic acid levels are on the lower side and we should keep her on supplementation.  She has good reticulocytosis response with 4.5% reticulocytes and absolute reticulocyte count of 113 K.  This robust reticulocytosis makes primary hematology disorder highly unlikely.    Recent Labs   Lab Test 06/04/24  0633 06/03/24  0658 06/02/24  0633 06/01/24  0629 05/31/24  2313 05/31/24  1218 05/31/24  1037   B12  --   --   --   --   --   --  568   FOLIC  --   --   --   --   --  6.1  --    HGB 8.2* 8.4* 7.9* 8.0* 7.6* 7.6* 8.1*   RETICABSCT  --   --   --   --   --  0.113* 0.138*   RETP  --   --   --   --   --  4.5* 5.2*     There has been a concern for hemolytic anemia which is supported by low haptoglobin levels and elevated LDH.  Hemolytic anemia is often seen in patients with other autoimmune  disorders including polymyalgia rheumatica.  Her elevated LDH can be very nonspecific and haptoglobin levels can drop with minimal hemolysis.  She does not have any elevations in her bilirubin levels which is typically seen in the setting of any significant hemolysis.  She had a peripheral smear which was unremarkable for the cause.  Notably there was no evidence of microangiopathic hemolysis, bite cells, spherocytes to suggest hemolysis.    Kappa free light chains are marginally elevated.  But she has high normal lambda free light signs which makes primary light chain disorder unlikely hide impossible.    She likely has multifactorial anemia.     RECOMMENDATIONS:  -Agree with supportive cares as current  -Recommend supplementation with folic acid and B12  -Can consider iron supplementation early but will not pursue intravenous iron given the elevated ferritin levels  -Okay to discharge once medically stable and follow-up in hematology  -We will sign off but will be available with any questions  -I have reviewed her care with Dr. Cartwright    Over 55 min spent on day of visit including review of tests, obtaining/reviewing separately obtained history/physical exam, counseling patient, ordering medications/tests/procedures, communicating with PCP/consultants, and documenting in electronic medical record.    Akil Dias  Hematologist and Medical Oncologist  [facility]     Akil Dias  Adj ,  Division of Hematology, Oncology & Transplantation  [facility].      INTERIM HISTORY:  Patient was alone in the room at the time of my evaluation.  She notes that she has been admitted after she was noted to be anemic in our clinic and was referred to the emergency department for blood transfusion.     MEDICATIONS:  Current Facility-Administered Medications   Medication Dose Route Frequency Provider Last Rate Last Admin    acetaminophen (TYLENOL) tablet 650 mg  650 mg Oral Q4H PRN Tonia Shin  YAN Ogelsby   650 mg at 06/03/24 1448    Or    acetaminophen (TYLENOL) Suppository 650 mg  650 mg Rectal Q4H PRN Tonia Shin PA-C        donepezil (ARICEPT) tablet 5 mg  5 mg Oral Daily Tonia Shin PA-C   5 mg at 06/03/24 0931    escitalopram (LEXAPRO) tablet 10 mg  10 mg Oral Daily Tonia Shin PA-C   10 mg at 06/03/24 0932    folic acid (FOLVITE) tablet 1 mg  1 mg Oral Daily Lemuel Lloyd MD   1 mg at 06/03/24 0932    gabapentin (NEURONTIN) capsule 200 mg  200 mg Oral TID Tonia Shin PA-C   200 mg at 06/03/24 1326    Lidocaine (LIDOCARE) 4 % Patch 1 patch  1 patch Transdermal Q24H Tonia Shin PA-C   1 patch at 06/03/24 0935    metoprolol tartrate (LOPRESSOR) tablet 25 mg  25 mg Oral BID Tonia Shin PA-C   25 mg at 06/03/24 0931    mirabegron (MYRBETRIQ) 24 hr tablet 25 mg  25 mg Oral Daily Tonia Shin PA-C   25 mg at 06/03/24 0931    ondansetron (ZOFRAN ODT) ODT tab 4 mg  4 mg Oral Q6H PRN Tonia Shin PA-C        Or    ondansetron (ZOFRAN) injection 4 mg  4 mg Intravenous Q6H PRN Tonia Shin PA-C        polyethylene glycol (MIRALAX) Packet 17 g  17 g Oral BID PRN Tonia Shin PA-C        [Held by provider] rivaroxaban ANTICOAGULANT (XARELTO) tablet 20 mg  20 mg Oral Daily Tonia Shin PA-C        senna-docusate (SENOKOT-S/PERICOLACE) 8.6-50 MG per tablet 1 tablet  1 tablet Oral BID PRN Tonia Shin PA-C        Or    senna-docusate (SENOKOT-S/PERICOLACE) 8.6-50 MG per tablet 2 tablet  2 tablet Oral BID PRN Tonia Shin PA-C               ALLERGIES:  No Known Allergies      PHYSICAL EXAM:  Vital signs:  Temp: 98.5  F (36.9  C) Temp src: Oral BP: 121/56 Pulse: 62   Resp: 16 SpO2: 98 % O2 Device: None (Room air)     Weight: 52.1 kg (114 lb 13.8 oz)  Estimated body mass index is 19.11 kg/m  as calculated from the following:    Height  "as of 12/21/23: 1.651 m (5' 5\").    Weight as of this encounter: 52.1 kg (114 lb 13.8 oz).     LABS:  Recent Labs   Lab Test 06/03/24  0806 06/03/24  0658 06/02/24  0159 06/01/24  0629 05/31/24  1037 12/21/23  1103   NA  --  136  --  138 137 138   POTASSIUM  --  4.1  --  4.3 4.4 4.4   CHLORIDE  --  101  --  102 101 102   CO2  --  26  --  27 28 28   ANIONGAP  --  9  --  9 8 8   BUN  --  13.6  --  12.3 16.8 14.7   CR  --  0.60  --  0.65 0.73 0.69   GLC 94 94 105* 93 91 87   DANNY  --  8.7*  --  8.8 9.3 10.0     No results for input(s): \"MAG\", \"PHOS\" in the last 49340 hours.  Recent Labs   Lab Test 06/03/24  0658 06/02/24  0633 06/01/24  0629 05/31/24  2313 05/31/24  1218 05/31/24  1037   WBC 8.2 8.8 7.7  --  8.4 8.5   HGB 8.4* 7.9* 8.0* 7.6* 7.6* 8.1*   * 745* 794*  --  747* 786*   MCV 97 97 97  --  98 99   NEUTROPHIL  --   --  64  --  69 67     Recent Labs   Lab Test 06/03/24  0658 05/31/24 2313 05/31/24  1037 12/21/23  1103   BILITOTAL 0.4  --  0.6 0.4   ALKPHOS 119  --  128 123   ALT <5  --  <5 7   AST 18  --  27 19   ALBUMIN 3.3*  --  3.7 3.7   LDH  --  495*  --   --      TSH   Date Value Ref Range Status   05/31/2024 1.91 0.30 - 4.20 uIU/mL Final     Recent Labs   Lab Test 05/31/24  2313 05/31/24  1037   ELLIS  --  516*   IRON  --  47   FEB  --  278   IRONSAT  --  17   STRE 4.9*  --      Recent Labs   Lab Test 06/03/24  0658 06/02/24  0633 06/01/24  0629 05/31/24  2313 05/31/24  1218 05/31/24  1037   B12  --   --   --   --   --  568   FOLIC  --   --   --   --  6.1  --    HGB 8.4* 7.9* 8.0* 7.6* 7.6* 8.1*   RETICABSCT  --   --   --   --  0.113* 0.138*   RETP  --   --   --   --  4.5* 5.2*       Bld morphology pathology review: [id number]  Order: [id number] - Part of Panel Order [id number]  Collected 5/31/2024 12:18 PM       Status: Final result       Visible to patient: Yes (not seen)    0 Result Notes      Component    Final Diagnosis   Peripheral blood for morphology:  -Mild, approaching moderate, normochromic, " normocytic anemia with evidence of red cell regeneration  -Moderate thrombocytosis      Electronically signed by Kayla Deleon MD on 6/3/2024 at  1:27 PM   Comment     The percent saturation of iron is 17, indicating iron deficiency.  The calculated iron deficit is 1170 g.  Repletion with an intravenous method may be considered.  The elevated platelet count may relate to the iron deficiency and/or an inflammatory cause.  A myeloproliferative process is in the differential and Dru 2 evaluation is in process.  The patient has been recently evaluated by a hematologist.     A hemolytic component appears present given low haptoglobin and elevated LDH.  The direct antiglobulin test is negative.  Red cell morphology does not show an increase in red cell fragmentation; there is no evidence of a microangiopathic blood picture.  Some possible spherocytes are noted.  Again, the direct antiglobulin test and antibody screen are negative.  A PNH assessment by flow cytometry is pending   Clinical Information                 IMAGING:  Results for orders placed or performed during the hospital encounter of 05/31/24   CT Head w/o Contrast    Narrative    EXAM: CT HEAD W/O CONTRAST  5/31/2024 2:10 PM     HISTORY: increased confusion. Recurrent falls on Xarelto       COMPARISON: None    TECHNIQUE: Using multidetector thin collimation helical acquisition  technique, axial, coronal and sagittal CT images from the skull base  to the vertex were obtained without intravenous contrast.   (topogram) image(s) also obtained and reviewed. Dose reduction  techniques were used.    FINDINGS:  No acute intracranial hemorrhage, mass effect, or midline shift.  Subcortical, periventricular areas of white matter hypoattenuation,  advanced parenchymal volume loss and compensatory ventricular  dilatation compatible with sequelae of chronic microvascular ischemic  disease. Preserved gray-white matter differentiation. Dense basal  ganglia  calcifications.    Atraumatic calvarium. No substantial paranasal sinus mucosal disease.  Clear mastoid air cells. Nonfocal orbits.       Impression    IMPRESSION:   1. No evidence for acute intracranial hemorrhage, hydrocephalus or  transcortical infarct. No skull fracture.  2. Brain atrophy and advanced presumed sequela of chronic  microvascular ischemic disease.    GEE SYKES DO         SYSTEM ID:  [id number]   Echocardiogram Complete     Value    LVEF  65-70%    [facility]    [id number]  [id number]  [id number]  [id number]^ELIZABETH^MAR^SUJATHA     [facility]  Echocardiography Laboratory  [address] [place] [address]     Name: ERICK GRIMM  MRN: [id number]  : 1935  Study Date: 2024 03:36 PM  Age: 89 yrs  Gender: Female  Patient Location: [facility]  Reason For Study: Murmur  Ordering Physician: MAR JOHNSON  Performed By: EEV     BSA: 1.6 m2  Height: 65 in  Weight: 118 lb  HR: 58  BP: 133/52 mmHg  ______________________________________________________________________________  Procedure  Complete Portable Echo Adult. Optison (NDC #4944-1012) given intravenously.  ______________________________________________________________________________  Interpretation Summary     The visual ejection fraction is 65-70%.  Diastolic Doppler findings (E/E' ratio and/or other parameters) suggest left  ventricular filling pressures are increased.  There is moderate to severe mitral annular calcification.  There is mild to moderate (1-2+) tricuspid regurgitation.  Mild (35-45mmHg) pulmonary hypertension is present.  The aortic valve is abnormal but not well seen. There is trace aortic valve  insufficiency. By doppler there is only mild aortic valve stenosis-- I have  some concern the doppler may be underestimating the degree of aortic stenosis.  If clinically indicated please request a limited repeat echo during weekday to  re-confirm degree of aortic valve  stenosis  ______________________________________________________________________________  Left Ventricle  The left ventricle is normal in size. There is borderline concentric left  ventricular hypertrophy. The visual ejection fraction is 65-70%. Diastolic  Doppler findings (E/E' ratio and/or other parameters) suggest left ventricular  filling pressures are increased. Diastolic function not assessed due to  significant mitral annular calcification. Normal left ventricular wall motion.     Right Ventricle  The right ventricle is normal in size and function.     Atria  The left atrium is moderately dilated. Right atrial size is normal.     Mitral Valve  There is moderate to severe mitral annular calcification. There is trace  mitral regurgitation. There is no mitral valve stenosis.     Tricuspid Valve  There is mild to moderate (1-2+) tricuspid regurgitation. The right  ventricular systolic pressure is approximated at 35.2 mmHg plus the right  atrial pressure. IVC diameter <2.1 cm collapsing >50% with sniff suggests a  normal RA pressure of 3 mmHg. Mild (35-45mmHg) pulmonary hypertension is  present.     Aortic Valve  The aortic valve is abnormal but not well seen. There is trace aortic valve  insufficiency. By doppler there is only mild aortic valve stenosis-- I have  some concern the doppler may be underestimating the degree of aortic stenosis.  If clinically indicated please request a limited repeat echo during weekday to  re-confirm degree of aortic valve stenosis.     Pulmonic Valve  The pulmonic valve is not well seen, but is grossly normal.     Vessels  (The upper limit of normal is 3.7cm for aortic root diameter.). The aortic  root is normal size. The inferior vena cava was normal in size with preserved  respiratory variability.     Pericardium  The pericardium appears normal.     Rhythm  Sinus rhythm was noted.  ______________________________________________________________________________  MMode/2D  Measurements & Calculations     IVSd: 0.97 cm  LVIDd: 3.6 cm  LVIDs: 2.2 cm  LVPWd: 1.2 cm  FS: 38.9 %  LV mass(C)d: 120.5 grams  LV mass(C)dI: 76.2 grams/m2  Ao root diam: 3.4 cm  LA dimension: 4.4 cm  asc Aorta Diam: 3.6 cm  LA/Ao: 1.3  Ao root diam index Ht(cm/m): 2.0  Ao root diam index BSA (cm/m2): 2.1  Asc Ao diam index BSA (cm/m2): 2.3  Asc Ao diam index Ht(cm/m): 2.2  RWT: 0.64     Doppler Measurements & Calculations  MV E max dheeraj: 98.5 cm/sec  MV A max dheeraj: 88.1 cm/sec  MV E/A: 1.1  MV dec time: 0.16 sec     Ao V2 max: 162.0 cm/sec  Ao max PG: 10.0 mmHg  Ao V2 mean: 110.0 cm/sec  Ao mean P.0 mmHg  Ao V2 VTI: 38.8 cm  LV V1 max P.2 mmHg  LV V1 max: 102.0 cm/sec  LV V1 VTI: 21.6 cm  PA acc time: 0.13 sec  TR max dheeraj: 296.5 cm/sec  TR max P.2 mmHg  AV Dheeraj Ratio (DI): 0.63  E/E' av.1  Lateral E/e': 14.8  Medial E/e': 19.3     ______________________________________________________________________________  Report approved by: Aditya Negron 2024 11:24 AM         Echocardiogram Limited     Value    LVEF  60-65%    Narrative    [id number]  [id number]  [id number]  [id number]^EKLOF^SHIRA^SERGIO     [facility]  Echocardiography Laboratory  [address] [place] [id number]     Name: ERICK GRIMM  MRN: [id number]  : 1935  Study Date: 2024 09:40 AM  Age: 89 yrs  Gender: Female  Patient Location: [facility]  Reason For Study: Aortic Valve Disorder  Ordering Physician: SHIRA PHILLIPS  Referring Physician: Calvin Young  Performed By: Cindi Ambriz     BSA: 1.6 m2  Height: 65 in  Weight: 114 lb  HR: 72  BP: 119/55 mmHg  ______________________________________________________________________________  Procedure  Limited Portable Echo Adult. Optison (NDC #4158-0451) given intravenously.  ______________________________________________________________________________  Interpretation Summary     No hemodynamically significant valvular aortic  stenosis.  No aortic regurgitation is present.  The visual ejection fraction is 60-65%.  Left ventricular systolic function is normal.  Technically difficult study due to the presence of breast implants. The study  was technically difficult. The study was technically limited.  ______________________________________________________________________________  Left Ventricle  The visual ejection fraction is 60-65%. Left ventricular systolic function is  normal.     Right Ventricle  The right ventricle is normal in size and function.     Atria  The left atrium is moderately dilated. Right atrial size is normal.     Mitral Valve  There is moderate to severe mitral annular calcification.     Tricuspid Valve  There is trace tricuspid regurgitation.     Aortic Valve  Thickened aortic valve leaflets. No aortic regurgitation is present. The  calculated aortic valve are is 2.0 cm^2. The peak AoV pressure gradient is  11.0 mmHg. The mean AoV pressure gradient is 5.9 mmHg. No hemodynamically  significant valvular aortic stenosis.     Pulmonic Valve  There is no pulmonic valvular regurgitation.     Vessels  IVC diameter <2.1 cm collapsing >50% with sniff suggests a normal RA pressure  of 3 mmHg.     Pericardium  Trivial pericardial effusion.     ______________________________________________________________________________  MMode/2D Measurements & Calculations  Ao root diam: 3.0 cm  asc Aorta Diam: 3.4 cm  LVOT diam: 2.0 cm  LVOT area: 3.2 cm2  Ao root diam index Ht(cm/m): 1.8     Ao root diam index BSA (cm/m2): 1.9  Asc Ao diam index BSA (cm/m2): 2.2  Asc Ao diam index Ht(cm/m): 2.1     Doppler Measurements & Calculations  MV max P.5 mmHg  MV mean P.0 mmHg  MV V2 VTI: 33.6 cm  MVA(VTI): 2.0 cm2  Ao V2 max: 176.1 cm/sec  Ao max P.0 mmHg  Ao V2 mean: 115.9 cm/sec  Ao mean P.9 mmHg  Ao V2 VTI: 33.4 cm  NANCY(I,D): 2.0 cm2  NANCY(V,D): 1.8 cm2  LV V1 max P.1 mmHg  LV V1 max: 101.4 cm/sec  LV V1 VTI: 21.1 cm  SV(LVOT):  67.0 ml  SI(LVOT): 43.0 ml/m2  AV Dheeraj Ratio (DI): 0.58  NANCY Index (cm2/m2): 1.3     ______________________________________________________________________________  Report approved by: Aditya Swanson 06/03/2024 11:18 AM

## 2024-06-03 NOTE — PROGRESS NOTES
Care Management Follow Up    Length of Stay (days): 2    Expected Discharge Date: 06/03/2024     Concerns to be Addressed: discharge planning     Patient plan of care discussed at interdisciplinary rounds: Yes    Anticipated Discharge Disposition: Assisted Living     Anticipated Discharge Services: None  Anticipated Discharge DME: Wheelchair (has own from home here)    Patient/family educated on Medicare website which has current facility and service quality ratings: no  Education Provided on the Discharge Plan: Yes  Patient/Family in Agreement with the Plan:      Referrals Placed by CM/SW:    Private pay costs discussed: Not applicable    Additional Information:  Writer received a call from [facility] staffHoa. She tells writer that patient is an [facility] resident  at [facility]. She gets medication administration, shower assistance, escorts to meals and MARY hose applications.   They were wondering if she is coming back and gave writer number to call if she is of [phone].    Radha Marks RN

## 2024-06-03 NOTE — PROGRESS NOTES
PRIMARY Concern: Anemia, diarrhea/melena  SAFETY RISK Concerns (fall risk, behaviors, etc.): Fall risk      Isolation/Type: None   Tests/Procedures for NEXT shift: Labs  Consults? (Pending/following, signed-off?) [facility], hem/onc, PT/SW following  Where is patient from? (Home, TCU, etc.): [facility]  Other Important info for NEXT shift: Patient has own wheelchair in room. Take Pills One at a time with Whole With water. Pt Need Help to Order Food. Possible TTE tmrw to reassess aortic stenosis  Anticipated DC date & active delays: TBD    SUMMARY NOTE:                Orientation/Cognitive: AxOx4  Observation Goals (Met/ Not Met): Not Met  Mobility Level/Assist Equipment: Ax1 pivot to commode, wheelchair at baseline  Antibiotics & Plan (IV/po, length of tx left): None  Pain Management: PRN tylenol given x1 for shoulder pain  Tele/VS/O2: VSS on RA,Tele Sinus Artis  ABNL Lab/BG: Hg=7.9  Diet: Reg  Bowel/Bladder: Cont of B/B  Skin Concerns: Blanchable redness on bottom, dry heels/feet  Drains/Devices: PIV SL  Patient Stated Goal for Today: rest

## 2024-06-03 NOTE — PROGRESS NOTES
[facility]    Medicine Progress Note - Hospitalist Service    Date of Admission:  5/31/2024    Assessment & Plan   Nallely Manrique is a 89 year old female admitted on 5/31/2024 with a PMH significant for polymyalgia rheumatica, DVT post hip replacement, paroxysmal Afib, HTN, HLD, COPD who presented from [facility] for further evaluation of anemia after having a Hgb 7.1 earlier this week. Note baseline Hgb 8-9 with chronic thrombocytosis dating back to 1/2023. During interview, son actually reports more concern about pt's persistent diarrhea that has been ongoing since 7/2023. Registered under observation status for further evaluation and treatment.     Persistent Diarrhea  Diarrhea since hip replacement in July '23. Prior colonoscopy years ago reported as normal. No known history of IBD. ? Microscopic colitis? CT A/P 12/2023 negative for acute pathology, extensive colonic diverticulosis, no diverticulitis.   - Registered to observation initially, admitted to inpatient on 6/1/24.  - [facility] consulted, appreciate their assistance.    - Enteric panel negative on 5/31/24.  - ESR elevated at 51.  - Calprotectin at upper end of normal.  - Celiac panel negative.  - Monitor and record stool output.  - No plans for EGD/colonoscopy at this time, likely plan for outpatient follow up.    Acute on chronic borderline macrocytic anemia  Chronic thrombocytosis  Chronic melena, but suspect related to iron supplementation  No evidence of active GI bleed, vaginal bleed or hematuria. No hx of PUD, not GERD. On Xarelto with last dose the evening prior to admission. Takes iron supplementation. No abdominal pain, change in appetite. Not on special diet. Prior colonoscopy years ago reported as normal. No known hematologic disorder  *Hemoglobin since admission 8.1 --> 7.6, was down to 7.1 earlier in the week; baseline hemoglobin dating back to 1/2023 is in 8-9 range.  *Platelets elevated since admission  700-800 range; baseline platelets back to 1/2023 have been 500-900 range.  *Iron studies, TSH normal, folate low normal, B12 normal. Hemoccult negative.  - Peripheral smear pending.  - Haptoglobin low, LDH elevated. Concerning for hemolysis, however bilirubin within normal limits.  - Hemoglobin stable at 8.4 on 6/3/24, recheck in AM.  - [facility] consulted, appreciate their assistance.  - HERB negative on 6/1/24, suggesting non-immune hemolysis.  - STFR slightly elevated.  - PNH negative.  - SPEP with no obvious monoclonal protein.  - Further labs to evaluate etiology pending (G6PD, FLC, JAK2).  - [facility] consulted, appreciate their assistance.  - No plan for EGD/colonoscopy at this time, likely plan for outpatient follow up.  - Conditional orders for transfusion if hemoglobin < 7.  - PTA Xarelto held initially during evaluation of anemia, resumed on evening of 6/3/24 as no obvious bleeding noted.    Mild acute encephalopathy  Cognitive impairment   *Son reports cognitive decline after surgery in July '23, but gradually improved afterward until about a week ago when he noticed worsening memory deficits.  - CT head on 5/31/2024 showed no acute changes, did show brain atrophy and advanced presumed sequelae of chronic microvascular ischemic disease.    - Continue PTA Aricept.  - OT consulted for cognitive evaluation, she scored 19/30 on SLUMS testing.  - Appears that this a significant change in her cognition over the past 7-10 days. No other obvious focal Neurologic deficits. B12 and folate within normal limits. TSH within normal limits. No obvious infection identified, will check UA.  - Will ask Neurology to evaluate her, appreciate their assistance.    Cardiac murmur  Aortic stenosis, ruled out  Noted systolic murmur in the aortic region. Patient reports she has had this for years. No recent cardiac symptoms. No prior echo available in epic.  - TTE on 6/1/2024 showed LVEF 65 to 70%, mild to moderate tricuspid  regurg, mild pulmonary hypertension, at least mild aortic stenosis - see report in epic.  - Repeat limited TTE on 6/3/2024 showed no hemodynamically significant valvular aortic stenosis.    Generalized Weakness  Reports recent fatigue. Wheelchair bound, transfers herself to the toilet.   - PT consulted, appreciate their assistance, recommending TCU.  - SW for discharge planning, appreciate their assistance.  BASELINE PRIOR TO ADMISSION  1.  Living Situation:  [facility]  2. Ambulation:  wheelchair  3.  Mental status:  alert and oriented X4  4. Diet: regular   5.  Family contact:  Extended Emergency Contact Information  Primary Emergency Contact: Cody Manrique  Mobile Phone: [phone]  Relation: Son     Depression  - Continue PTA Lexapro.    Paroxsymal Atrial Fibrillation  History of DVT post surgery  *Recent LE US negative.  - PTA Xarelto held as noted above, resumed on 6/3/24.  - Continue PTA Metoprolol with hold parameters.    PMR  - No acute issues. Baseline chronic hip pain. Not on steroids.           Diet: Regular Diet Adult    DVT Prophylaxis: Pneumatic Compression Devices  Arguelles Catheter: Not present  Lines: None     Cardiac Monitoring: ACTIVE order. Indication: Tachyarrhythmias, acute (48 hours)  Code Status: No CPR- Do NOT Intubate      Clinically Significant Risk Factors              # Hypoalbuminemia: Lowest albumin = 3.3 g/dL at 6/3/2024  6:58 AM, will monitor as appropriate                      # Financial/Environmental Concerns: none         Disposition Plan     Medically Ready for Discharge: Anticipated in 2-4 Days             Charli Cartwright MD  Hospitalist Service  [facility]  Securely message with [employer] (more info)  Text page via [employer] Paging/Directory   ______________________________________________________________________    Interval History   Nallely Manrique was seen this morning and again this afternoon.  She feels okay.  Continues to have 1 or 2  relatively loose stools per day, no blood or melena, feels like she is about at her baseline as far as her bowel movements for the past year or so.  Denies fevers, lightheadedness, chest pain, shortness of breath, nausea, abdominal pain.  Discussed results of TTE, GI evaluation, hematology evaluation as noted above.  Discussed plan of care with her son, Cody, by phone this evening.    Physical Exam   Vital Signs: Temp: 99  F (37.2  C) Temp src: Oral BP: (!) 148/64 Pulse: 66   Resp: 16 SpO2: 98 % O2 Device: None (Room air)    Weight: 114 lbs 13.75 oz    Constitutional: awake, alert, cooperative, no apparent distress, laying in the hospital bed  Respiratory: no increased work of breathing, clear to auscultation bilaterally, no crackles or wheezing  Cardiovascular: regular rate and rhythm, systolic murmur noted  GI: bowel sounds positive, soft, non-distended, non-tender  Skin: warm, dry, pale  Musculoskeletal: no lower extremity pitting edema present  Neurologic: awake, alert, answers questions appropriately, moves all extremities    Medical Decision Making       45 MINUTES SPENT BY ME on the date of service doing chart review, history, exam, documentation & further activities per the note.      Data     I have personally reviewed the following data over the past 24 hrs:    8.2  \   8.4 (L)   / 717 (H)     136 101 13.6 /  94   4.1 26 0.60 \     ALT: <5 AST: 18 AP: 119 TBILI: 0.4   ALB: 3.3 (L) TOT PROTEIN: 6.4 LIPASE: N/A       Imaging results reviewed over the past 24 hrs:   Recent Results (from the past 24 hour(s))   Echocardiogram Limited   Result Value    LVEF  60-65%    Narrative    [id number]  [id number]  [id number]  [id number]^SHIRA^SERGIO     [facility]  Echocardiography Laboratory  [address] [place], [place] [address]     Name: ERICK GRIMM  MRN: [id number]  : 1935  Study Date: 2024 09:40 AM  Age: 89 yrs  Gender: Female  Patient Location: [facility]  Reason For Study:  Aortic Valve Disorder  Ordering Physician: SHIRA PHILLIPS  Referring Physician: Calvin Young  Performed By: Cindi Ambriz     BSA: 1.6 m2  Height: 65 in  Weight: 114 lb  HR: 72  BP: 119/55 mmHg  ______________________________________________________________________________  Procedure  Limited Portable Echo Adult. Optison (NDC #[id number]) given intravenously.  ______________________________________________________________________________  Interpretation Summary     No hemodynamically significant valvular aortic stenosis.  No aortic regurgitation is present.  The visual ejection fraction is 60-65%.  Left ventricular systolic function is normal.  Technically difficult study due to the presence of breast implants. The study  was technically difficult. The study was technically limited.  ______________________________________________________________________________  Left Ventricle  The visual ejection fraction is 60-65%. Left ventricular systolic function is  normal.     Right Ventricle  The right ventricle is normal in size and function.     Atria  The left atrium is moderately dilated. Right atrial size is normal.     Mitral Valve  There is moderate to severe mitral annular calcification.     Tricuspid Valve  There is trace tricuspid regurgitation.     Aortic Valve  Thickened aortic valve leaflets. No aortic regurgitation is present. The  calculated aortic valve are is 2.0 cm^2. The peak AoV pressure gradient is  11.0 mmHg. The mean AoV pressure gradient is 5.9 mmHg. No hemodynamically  significant valvular aortic stenosis.     Pulmonic Valve  There is no pulmonic valvular regurgitation.     Vessels  IVC diameter <2.1 cm collapsing >50% with sniff suggests a normal RA pressure  of 3 mmHg.     Pericardium  Trivial pericardial effusion.     ______________________________________________________________________________  MMode/2D Measurements & Calculations  Ao root diam: 3.0 cm  asc Aorta Diam: 3.4 cm  LVOT  diam: 2.0 cm  LVOT area: 3.2 cm2  Ao root diam index Ht(cm/m): 1.8     Ao root diam index BSA (cm/m2): 1.9  Asc Ao diam index BSA (cm/m2): 2.2  Asc Ao diam index Ht(cm/m): 2.1     Doppler Measurements & Calculations  MV max P.5 mmHg  MV mean P.0 mmHg  MV V2 VTI: 33.6 cm  MVA(VTI): 2.0 cm2  Ao V2 max: 176.1 cm/sec  Ao max P.0 mmHg  Ao V2 mean: 115.9 cm/sec  Ao mean P.9 mmHg  Ao V2 VTI: 33.4 cm  NANCY(I,D): 2.0 cm2  NANCY(V,D): 1.8 cm2  LV V1 max P.1 mmHg  LV V1 max: 101.4 cm/sec  LV V1 VTI: 21.1 cm  SV(LVOT): 67.0 ml  SI(LVOT): 43.0 ml/m2  AV Dheeraj Ratio (DI): 0.58  NANCY Index (cm2/m2): 1.3     ______________________________________________________________________________  Report approved by: Aditya Swanson 2024 11:18 AM

## 2024-06-03 NOTE — PLAN OF CARE
Goal Outcome Evaluation:    PRIMARY Concern: Pt here with c/o of diarrhea, black stools and low Hgb-7.1  SAFETY RISK Concerns (fall risk, behaviors, etc.): Fall risk      Isolation/Type: None   Tests/Procedures for NEXT shift: Echo,CBC,CMP  Consults? (Pending/following, signed-off?)GI, hem/onc, PT/SW following  Where is patient from? (Home, TCU, etc.): [facility]  Other Important info for NEXT shift: Patient own wheelchair is in room.    Anticipated DC date & active delays: TBD    SUMMARY NOTE:                Orientation/Cognitive: AxOx4  Observation Goals (Met/ Not Met): Not Met  Mobility Level/Assist Equipment: Ax1 pivot to BSC, uses wheelchair at baseline  Antibiotics & Plan (IV/po, length of tx left):n/a  Pain Management:Denies pain this shift, PRN Tyl available  Tele/VS/O2: VSS on RA,Tele SR  ABNL Lab/BG: HGB 7.9  Diet: Reg  Bowel/Bladder: Cont of B&B  Skin Concerns: Blanchable redness on bottom, dry heels/feet  Drains/Devices: PIV SL  Patient Stated Goal for Today: rest

## 2024-06-04 ENCOUNTER — PATIENT OUTREACH (OUTPATIENT)
Dept: ONCOLOGY | Facility: CLINIC | Age: 89
End: 2024-06-04
Payer: MEDICARE

## 2024-06-04 ENCOUNTER — APPOINTMENT (OUTPATIENT)
Dept: PHYSICAL THERAPY | Facility: CLINIC | Age: 89
DRG: 812 | End: 2024-06-04
Payer: MEDICARE

## 2024-06-04 ENCOUNTER — APPOINTMENT (OUTPATIENT)
Dept: OCCUPATIONAL THERAPY | Facility: CLINIC | Age: 89
DRG: 812 | End: 2024-06-04
Payer: MEDICARE

## 2024-06-04 LAB
ERYTHROCYTE [DISTWIDTH] IN BLOOD BY AUTOMATED COUNT: 14.8 % (ref 10–15)
HCT VFR BLD AUTO: 26.5 % (ref 35–47)
HGB BLD-MCNC: 8.2 G/DL (ref 11.7–15.7)
MCH RBC QN AUTO: 29.5 PG (ref 26.5–33)
MCHC RBC AUTO-ENTMCNC: 30.9 G/DL (ref 31.5–36.5)
MCV RBC AUTO: 95 FL (ref 78–100)
PLATELET # BLD AUTO: 705 10E3/UL (ref 150–450)
RBC # BLD AUTO: 2.78 10E6/UL (ref 3.8–5.2)
WBC # BLD AUTO: 9.4 10E3/UL (ref 4–11)

## 2024-06-04 PROCEDURE — 99232 SBSQ HOSP IP/OBS MODERATE 35: CPT | Performed by: HOSPITALIST

## 2024-06-04 PROCEDURE — 97542 WHEELCHAIR MNGMENT TRAINING: CPT | Mod: GP

## 2024-06-04 PROCEDURE — 97530 THERAPEUTIC ACTIVITIES: CPT | Mod: GP

## 2024-06-04 PROCEDURE — 250N000013 HC RX MED GY IP 250 OP 250 PS 637: Performed by: HOSPITALIST

## 2024-06-04 PROCEDURE — 120N000001 HC R&B MED SURG/OB

## 2024-06-04 PROCEDURE — 85027 COMPLETE CBC AUTOMATED: CPT | Performed by: HOSPITALIST

## 2024-06-04 PROCEDURE — 97535 SELF CARE MNGMENT TRAINING: CPT | Mod: GO

## 2024-06-04 PROCEDURE — 250N000013 HC RX MED GY IP 250 OP 250 PS 637: Performed by: INTERNAL MEDICINE

## 2024-06-04 PROCEDURE — 250N000013 HC RX MED GY IP 250 OP 250 PS 637: Performed by: PHYSICIAN ASSISTANT

## 2024-06-04 PROCEDURE — 36415 COLL VENOUS BLD VENIPUNCTURE: CPT | Performed by: HOSPITALIST

## 2024-06-04 PROCEDURE — 250N000011 HC RX IP 250 OP 636: Performed by: HOSPITALIST

## 2024-06-04 RX ORDER — MULTIPLE VITAMINS W/ MINERALS TAB 9MG-400MCG
1 TAB ORAL DAILY
Status: DISCONTINUED | OUTPATIENT
Start: 2024-06-04 | End: 2024-06-05 | Stop reason: HOSPADM

## 2024-06-04 RX ORDER — FERROUS SULFATE 325(65) MG
325 TABLET, DELAYED RELEASE (ENTERIC COATED) ORAL EVERY OTHER DAY
COMMUNITY
Start: 2024-06-04

## 2024-06-04 RX ORDER — CEFTRIAXONE 1 G/1
1 INJECTION, POWDER, FOR SOLUTION INTRAMUSCULAR; INTRAVENOUS EVERY 24 HOURS
Status: DISCONTINUED | OUTPATIENT
Start: 2024-06-04 | End: 2024-06-05 | Stop reason: HOSPADM

## 2024-06-04 RX ORDER — CEFDINIR 300 MG/1
300 CAPSULE ORAL 2 TIMES DAILY
Qty: 8 CAPSULE | Refills: 0 | Status: SHIPPED | OUTPATIENT
Start: 2024-06-04 | End: 2024-06-08

## 2024-06-04 RX ORDER — PNV NO.95/FERROUS FUM/FOLIC AC 28MG-0.8MG
1 TABLET ORAL
Qty: 30 TABLET | Refills: 0 | Status: SHIPPED | OUTPATIENT
Start: 2024-06-04

## 2024-06-04 RX ADMIN — ACETAMINOPHEN 650 MG: 325 TABLET, FILM COATED ORAL at 17:33

## 2024-06-04 RX ADMIN — METOPROLOL TARTRATE 25 MG: 25 TABLET, FILM COATED ORAL at 20:02

## 2024-06-04 RX ADMIN — Medication 1 TABLET: at 13:13

## 2024-06-04 RX ADMIN — RIVAROXABAN 20 MG: 20 TABLET, FILM COATED ORAL at 17:34

## 2024-06-04 RX ADMIN — DONEPEZIL HYDROCHLORIDE 5 MG: 5 TABLET, FILM COATED ORAL at 08:24

## 2024-06-04 RX ADMIN — FOLIC ACID 1 MG: 1 TABLET ORAL at 08:24

## 2024-06-04 RX ADMIN — ACETAMINOPHEN 650 MG: 325 TABLET, FILM COATED ORAL at 08:24

## 2024-06-04 RX ADMIN — ESCITALOPRAM OXALATE 10 MG: 10 TABLET ORAL at 08:24

## 2024-06-04 RX ADMIN — METOPROLOL TARTRATE 25 MG: 25 TABLET, FILM COATED ORAL at 08:24

## 2024-06-04 RX ADMIN — GABAPENTIN 200 MG: 100 CAPSULE ORAL at 13:13

## 2024-06-04 RX ADMIN — CEFTRIAXONE SODIUM 1 G: 1 INJECTION, POWDER, FOR SOLUTION INTRAMUSCULAR; INTRAVENOUS at 12:24

## 2024-06-04 RX ADMIN — LIDOCAINE 1 PATCH: 4 PATCH TOPICAL at 08:27

## 2024-06-04 RX ADMIN — GABAPENTIN 200 MG: 100 CAPSULE ORAL at 08:24

## 2024-06-04 RX ADMIN — GABAPENTIN 200 MG: 100 CAPSULE ORAL at 20:02

## 2024-06-04 RX ADMIN — MIRABEGRON 25 MG: 25 TABLET, FILM COATED, EXTENDED RELEASE ORAL at 08:24

## 2024-06-04 ASSESSMENT — ACTIVITIES OF DAILY LIVING (ADL)
ADLS_ACUITY_SCORE: 44
ADLS_ACUITY_SCORE: 44
ADLS_ACUITY_SCORE: 46
ADLS_ACUITY_SCORE: 44
ADLS_ACUITY_SCORE: 40
ADLS_ACUITY_SCORE: 46
ADLS_ACUITY_SCORE: 44
ADLS_ACUITY_SCORE: 46
ADLS_ACUITY_SCORE: 44
ADLS_ACUITY_SCORE: 44
ADLS_ACUITY_SCORE: 46
ADLS_ACUITY_SCORE: 40
ADLS_ACUITY_SCORE: 46
ADLS_ACUITY_SCORE: 46
ADLS_ACUITY_SCORE: 44

## 2024-06-04 NOTE — PLAN OF CARE
PRIMARY Concern: Pt here with c/o of diarrhea, black stools and low Hgb-7.1  SAFETY RISK Concerns (fall risk, behaviors, etc.): Fall risk      Isolation/Type: None   Tests/Procedures for NEXT shift: AM labs  Consults? (Pending/following, signed-off?) GI, hem/onc, PT/SW following, neurology and nutrition consult  Where is patient from? (Home, TCU, etc.): [facility]  Other Important info for NEXT shift: Patient own wheelchair is in room. Echo completed EF 60-65%   Anticipated DC date & active delays: TBD-- PT rec TCU    SUMMARY NOTE:  Orientation/Cognitive: A/Ox4, [place]  Observation Goals (Met/ Not Met): Inpt   Mobility Level/Assist Equipment: Ax1 gb/walker pivot to BSC/chair, uses wheelchair at baseline  Antibiotics & Plan (IV/po, length of tx left):n/a  Pain Management: R shoulder pain-- [place] lidocaine patch/gabapentin.  Tele/VS/O2: VSS RA  ABNL Lab/BG: Hgb 8.4, UC pending  Diet: Reg  Bowel/Bladder: Cont of B&B-- no BM this shift  Skin Concerns: Blanchable redness on bottom-- mepilex in place, dry heels/feet.  Drains/Devices: PIV SL  Patient Stated Goal for Today: rest

## 2024-06-04 NOTE — PROGRESS NOTES
Care Management Follow Up    Length of Stay (days): 3    Expected Discharge Date: 06/05/2024     Concerns to be Addressed: discharge planning     Patient plan of care discussed at interdisciplinary rounds: Yes    Anticipated Discharge Disposition: Assisted Living     Anticipated Discharge Services: None  Anticipated Discharge DME: Wheelchair (has own from home here)    Patient/family educated on Medicare website which has current facility and service quality ratings: no  Education Provided on the Discharge Plan: Yes  Patient/Family in Agreement with the Plan:      Referrals Placed by CM/SW:    Private pay costs discussed: Not applicable    Additional Information:  ALFREDO spoke with Hoa with [facility] and informed they can accept patient back tomorrow at current level of assist and would work with son on adjusting the care plan. ALFREDO informed they use [facility] for PT and can set up services would just need orders. They are requesting medication be sent to [facility] in [place] on day of discharge. ALFREDO informed they would prefer patient to return tomorrow(06/05) AM. ALFREDO asked to send notes and med list to [phone]. ALFREDO faxed updated notes.    Addendum: ALFREDO spoke with patient and called son Cody and both are on board. Cody would like to transport tomorrow at 0830. ALFREDO confirmed 0830 discharge with hospitalist and Hoa at [facility] and informed both are in agreement. Hospitalist will prepare discharge orders today. Care management will continue to follow.)    TAWANA Edmondson    [facility]

## 2024-06-04 NOTE — PROGRESS NOTES
New Patient Oncology Nurse Navigator Note     Referring provider: Charli Enriquez MD      Referring Clinic/Organization: [facility] - SHORT STAY      Referred to (specialty:) Hematology/Oncology     Requested provider (if applicable): NA     Date Referral Received: June 4, 2024     Evaluation for:  D64.9 (ICD-10-CM) - Anemia, unspecified type       Anemia, likely multifactorial. Thrombocytosis. Etiology unclear, seen by Hematology (Dr. Lloyd and Dr. Dias) during hospitalization, recommending outpatient hematology follow up.        Clinical History (per Nurse review of records provided):      Patient present to ED after lab work came back abnormal. Patient currently still inpatient at this time.   Hematology consulted inpatient for severe normocytic anemia and thrombocytosis.   RECOMMENDATIONS:  -Agree with supportive cares as current  -Recommend supplementation with folic acid and B12  -Can consider iron supplementation early but will not pursue intravenous iron given the elevated ferritin levels  -Okay to discharge once medically stable and follow-up in hematology  -We will sign off but will be available with any questions     Records Location: See Bookmarked material     Records Needed: NA     Additional testing needed prior to consult: NA    Payor: MEDICARE / Plan: MEDICARE / Product Type: Medicare /     June 4, 2024  Referral received and reviewed.  Sent to NPS to schedule.   Brenda ALMARAZ, RN   Oncology Nurse Navigator   [facility]   [phone] / [phone]

## 2024-06-04 NOTE — CONSULTS
"HOSPITAL NEUROLOGY    History of the Present Illness:    89 year old female presented to the ER 5/31/2024 after Hgb found to be 7.  She has a history of chronic constipation and melena, as well as thrombocytosis, and so has been evaluated by GI and hematology here.  Neurology consulted because she has had cognitive decline, that has been much worse in the last week.    Nallely notes that problems with her memory have been ongoing \"for awhile\" but sparse on any details.    She also notes that she has used a wheelchair for at least the last 6 months, saying that she broke her hip twice and told that she had to take every precaution to not fall again.      She has no significant complaints at the moment, except she endorses some longstanding joint / shoulder pain.  No new focal weakness or numbness or vision changes.      I spoke with Cody, her son, he explains that she lives in an assisted living facility.  The physician there saw her for pain in the ankle and knees and this pain seems to move around to different joints.      He relates that she had a hip surgery last summer and her cognition seemed \"drastically\" reduced ever since.  This did improve partially, \"about half the way back.\"  But in the last week, she has declined again.  By this he means she seemed more tired.  No new focal symptoms.    At baseline, she had been \"pretty independent\" but she does require assistance for bathing.  She had such issue with falling, but now usually in a wheelchair (though does transfers).      She does not cook, son has taken care of things like finances ever since the surgery last summer.  She has not seen a neurologist before for cognitive issues.      Current Facility-Administered Medications:     acetaminophen (TYLENOL) tablet 650 mg, 650 mg, Oral, Q4H PRN, 650 mg at 06/04/24 0824 **OR** acetaminophen (TYLENOL) Suppository 650 mg, 650 mg, Rectal, Q4H PRN, Tonia Shin PA-C    donepezil (ARICEPT) tablet 5 " mg, 5 mg, Oral, Daily, Tonia Shin PA-C, 5 mg at 06/04/24 0824    escitalopram (LEXAPRO) tablet 10 mg, 10 mg, Oral, Daily, Tonia Shin PA-C, 10 mg at 06/04/24 0824    folic acid (FOLVITE) tablet 1 mg, 1 mg, Oral, Daily, Lemuel Lloyd MD, 1 mg at 06/04/24 0824    gabapentin (NEURONTIN) capsule 200 mg, 200 mg, Oral, TID, Tonia Shin PA-C, 200 mg at 06/04/24 0824    Lidocaine (LIDOCARE) 4 % Patch 1 patch, 1 patch, Transdermal, Q24H, Tonia Shin PA-C, 1 patch at 06/04/24 0827    metoprolol tartrate (LOPRESSOR) tablet 25 mg, 25 mg, Oral, BID, Tonia Shin PA-C, 25 mg at 06/04/24 0824    mirabegron (MYRBETRIQ) 24 hr tablet 25 mg, 25 mg, Oral, Daily, Tonia Shin PA-C, 25 mg at 06/04/24 0824    ondansetron (ZOFRAN ODT) ODT tab 4 mg, 4 mg, Oral, Q6H PRN **OR** ondansetron (ZOFRAN) injection 4 mg, 4 mg, Intravenous, Q6H PRN, Tonia Shin PA-C    polyethylene glycol (MIRALAX) Packet 17 g, 17 g, Oral, BID PRN, Tonia Shin PA-C    rivaroxaban ANTICOAGULANT (XARELTO) tablet 20 mg, 20 mg, Oral, Daily with Chay freeman Jonathan Richard, MD, 20 mg at 06/03/24 1816    senna-docusate (SENOKOT-S/PERICOLACE) 8.6-50 MG per tablet 1 tablet, 1 tablet, Oral, BID PRN **OR** senna-docusate (SENOKOT-S/PERICOLACE) 8.6-50 MG per tablet 2 tablet, 2 tablet, Oral, BID PRN, Tonia Shin PA-C    Allergies:  No Known Allergies    Physical Examination:     /59 (BP Location: Left arm)   Pulse 69   Temp 98.1  F (36.7  C) (Oral)   Resp 16   Wt 52.6 kg (115 lb 15.4 oz)   SpO2 96%   BMI 19.30 kg/m      Body mass index is 19.3 kg/m .      NEUROLOGICAL:   MENTAL STATUS:   Alert and oriented x 3. Thought process and content unremarkable. Follows commands appropriately. Speech fluent.     CN:   II: Visual fields intact. PERRLA.   III, IV, VI: EOMI.    V: Symmetric facial sensation to light touch.   VII: Face symmetric.     XII: Tongue midline with symmetric movements.     MOTOR:   UE and HF limited by pain, particularly in shoulders and hips that is stated as longstanding             RIGHT LE: LEFT LE:   HF        KF                        KE        PF                         DF                          REFLEXES:       RIGHT:                LEFT:   Biceps        2/4                      2/4   Brachioradialis      2/4                      2/4   Patellar                 2/4                      2/4   Achilles                 2/                      2/4      SENSATION:   Light touch intact and symmetric.     CEREBELLAR:   Normal F-N-F.     GAIT:   deferred       Review of Diagnostics:  I personally reviewed and interpreted the followin/03/24 06:58   Sodium 136   Potassium 4.1   Chloride 101   Carbon Dioxide (CO2) 26   Urea Nitrogen 13.6   Creatinine 0.60   GFR Estimate 85   Calcium 8.7 (L)   Anion Gap 9   Albumin 3.3 (L)   Protein Total 6.4   Alkaline Phosphatase 119   ALT <5   AST 18   Bilirubin Total 0.4     Vitamin B12:   Lab Results   Component Value Date    B12 568 2024         Complete Blood Count:    Recent Labs   Lab Test 24  0633 24  0658 24  0633 24  0629 24  2313 24  1218 24  1037   WBC 9.4 8.2 8.8 7.7  --  8.4 8.5   RBC 2.78* 2.72* 2.62* 2.63*  --  2.53* 2.68*   HGB 8.2* 8.4* 7.9* 8.0*   < > 7.6* 8.1*   HCT 26.5* 26.4* 25.5* 25.6*  --  24.9* 26.5*   * 717* 745* 794*  --  747* 786*   LYMPH  --   --   --  21  --  17 17    < > = values in this interval not displayed.        Thyroid Stimulating Hormone:   Lab Results   Component Value Date    TSH 1.91 2024        CT Head without Contrast (2024)  reviewed images personally - significant white matter changes and generalized non-specific atrophy, noted significant high densities likely calcifications of basal ganglia  and focal area of falx - no acute findings  IMPRESSION:   1. No evidence for acute intracranial hemorrhage, hydrocephalus or  transcortical infarct. No skull fracture.  2. Brain atrophy and advanced presumed sequela of chronic  microvascular ischemic disease.    Impression:  Ms. Manrique is a 89 year old female admitted 5/31/2024.  She apparently has a history of thrombocytosis, generalized weakness (wheelchair bound), chronic diarrhea and melena (attributed to diverticulitis vs effect of iron supplement) and had been more fatigued in recent days and directed  to the ED after outpatient blood work revealed hemoglobin down to 7 (though 8.1 in ER).  Neurology consulted because of cognitive decline (which has been longer standing, on Aricept at home) has been much worse in the past week.  But in talking with son, she has not been independent in many iADLs for almost a year after a hip surgery.  There was some subsequent improvement, but her cognitive status never had recovered.  Head CT unremarkable.    Recommendations:  - It is not surprising that someone with baseline cognitive decline worsened in the setting of other medical issues, no further inpatient intervention required from a neurology standpoint, though she should follow up in clinic  - She endorses significant joint pain, PMR listed in medical history, will defer to internal medicine but may consider rheum input as an outpatient if this is an ongoing issue  - If alternative explanations for diarrhea are not found, I have no objection to holding donepezil if this is requested by GI, we can always try an alternative agent as an outpatient    Will sign off, she should follow up in clinic within 1-2 months following discharge    Patient at high risk for delirium - Continue to provide reorientation, reassurance, and stimulation during the day with lights on, blinds open, and the television on alternating with dark, quiet environment at night.  Interruptions during  the night should be limited as much as possible.      Isaiah Pathak MD (general neurology)  [facility] [phone]    1. Black stool    2. Anemia, unspecified type    3. Diarrhea, unspecified type

## 2024-06-04 NOTE — PLAN OF CARE
PRIMARY Concern: Pt here with c/o of diarrhea, black stools and low Hgb-7.1  SAFETY RISK Concerns (fall risk, behaviors, etc.): Fall risk   Isolation/Type: None   Tests/Procedures for NEXT shift: AM labs  Consults? (Pending/following, signed-off?) GI s/o, hem/onc s/o, PT/SW following, neurology s/o and nutrition s/o  Where is patient from? (Home, TCU, etc.): [facility]  Other Important info for NEXT shift: Patient own wheelchair is in room. Echo completed EF 60-65%   Anticipated DC date & active delays: TBD--  0830 discharge back to [facility] with ride by son    SUMMARY NOTE:                Orientation/Cognitive: A/Ox4, forgetful, [place]  Observation Goals (Met/ Not Met): Inpt   Mobility Level/Assist Equipment: Ax1-2 pivot to BSC/chair, uses wheelchair at baseline  Antibiotics & Plan (IV/po, length of tx left): Rocephin q24  Pain Management: R shoulder pain-- [place] lidocaine patch/gabapentin. PRN tylenol x2  Tele/VS/O2: VSS RA  ABNL Lab/BG: Hgb 8.4>8.2, UC pending  Diet: Reg  Bowel/Bladder: Cont of B&B-- had 2 small/soft BM today  Skin Concerns: Blanchable redness on bottom-- mepilex in place, dry heels/feet.  Drains/Devices: PIV SL  Patient Stated Goal for Today: rest

## 2024-06-04 NOTE — PROVIDER NOTIFICATION
MD Notification    Notified Person: MD    Notified Person Name:Dr. Marroquin    Notification Date/Time: 6/3/24 @ 2028    Notification Interaction: Wiki-PR messaging    Purpose of Notification:Please review UA results and let me know if any orders needed tonight. Not currently on Abx. UC pending.    Orders Received: Will Await culture results.    Comments:

## 2024-06-04 NOTE — PROGRESS NOTES
Care Management Follow Up    Length of Stay (days): 3    Expected Discharge Date: 06/05/2024     Concerns to be Addressed: discharge planning     Patient plan of care discussed at interdisciplinary rounds: Yes    Anticipated Discharge Disposition: Assisted Living     Anticipated Discharge Services: None  Anticipated Discharge DME: Wheelchair (has own from home here)    Patient/family educated on Medicare website which has current facility and service quality ratings: no  Education Provided on the Discharge Plan: Yes  Patient/Family in Agreement with the Plan:      Referrals Placed by CM/SW:    Private pay costs discussed: Not applicable    Additional Information:  SW informed by son that he would prefer for patient to return to [facility] with home care. Per son he feels she is close to baseline and has previously received PT there. SW called Hoa ([phone]) with [facility] and left a vm requesting a call back to discuss patient possibly returning with PT. ALFREDO will continue to follow.    TAWANA Edmondson    [facility]

## 2024-06-04 NOTE — PROGRESS NOTES
[facility]    Medicine Progress Note - Hospitalist Service    Date of Admission:  5/31/2024    Assessment & Plan   Nallely Manrique is a 89 year old female admitted on 5/31/2024 with a PMH significant for polymyalgia rheumatica, DVT post hip replacement, paroxysmal Afib, HTN, HLD, COPD who presented from [facility] for further evaluation of anemia after having a Hgb 7.1 earlier this week. Note baseline Hgb 8-9 with chronic thrombocytosis dating back to 1/2023. During interview, son actually reports more concern about pt's persistent diarrhea that has been ongoing since 7/2023. Registered under observation status for further evaluation and treatment.     Persistent Diarrhea  Diarrhea since hip replacement in July '23. Prior colonoscopy years ago reported as normal. No known history of IBD. ? Microscopic colitis? CT A/P 12/2023 negative for acute pathology, extensive colonic diverticulosis, no diverticulitis.   - Registered to observation initially, admitted to inpatient on 6/1/24.  - [facility] consulted, appreciate their assistance.    - Enteric panel negative on 5/31/24.  - ESR elevated at 51.  - Calprotectin at upper end of normal.  - Celiac panel negative.  - Monitor and record stool output.  - Diarrhea improved.  No plans for EGD/colonoscopy at this time.  Can follow up with [facility] as an outpatient if needed for recurrent diarrhea or if there is concern for bleeding (currently low suspicion).    Acute on chronic borderline macrocytic anemia  Chronic thrombocytosis  Chronic melena, but suspect related to iron supplementation  No evidence of active GI bleed, vaginal bleed or hematuria. No hx of PUD, not GERD. On Xarelto with last dose the evening prior to admission. Takes iron supplementation. No abdominal pain, change in appetite. Not on special diet. Prior colonoscopy years ago reported as normal. No known hematologic disorder  *Hemoglobin since admission 8.1 --> 7.6, was down to 7.1  earlier in the week; baseline hemoglobin dating back to 1/2023 is in 8-9 range.  *Platelets elevated since admission 700-800 range; baseline platelets back to 1/2023 have been 500-900 range.  *Iron studies, TSH normal, folate low normal, B12 normal. Hemoccult negative.  - Peripheral smear pending.  - Haptoglobin low, LDH elevated. Concerning for hemolysis, however bilirubin within normal limits.  - Hemoglobin stable at 8.4 on 6/3/24, recheck in AM.  - [facility] consulted, appreciate their assistance.  - HERB negative on 6/1/24, suggesting non-immune hemolysis.  - STFR slightly elevated.  - PNH, G6PD negative.  - SPEP with no obvious monoclonal protein. FLC marginally elevated, but high normal lambda free light chains which makes primary light chain disorder unlikely.  - Further labs pending (MPL CALR NGS Reflex Panel, JAK2).  - [facility] consulted, appreciate their assistance.  - No plan for EGD/colonoscopy at this time.  - Conditional orders for transfusion if hemoglobin < 7.  - PTA Xarelto held initially during evaluation of anemia, resumed on evening of 6/3/24 as no obvious bleeding noted.  - Will start prenatal vitamin upon discharge per Hematology recommendations to cover for potential nutritional causes (iron, B12, folate).    Mild acute encephalopathy  Cognitive impairment   *Son reports cognitive decline after surgery in July '23, but gradually improved afterward until about a week ago when he noticed worsening memory deficits.  - CT head on 5/31/2024 showed no acute changes, did show brain atrophy and advanced presumed sequelae of chronic microvascular ischemic disease.    - Continue PTA Aricept.  - OT consulted for cognitive evaluation, she scored 19/30 on SLUMS testing.  - Appears that this a significant change in her cognition over the past 7-10 days. No other obvious focal neurologic deficits. B12 and folate within normal limits. TSH within normal limits. No obvious infectious symptoms noted, but UA  on 6/3/24 suggestive of UTI as noted below.  - Neurology consulted, appreciate their assistance.    Possible UTI  UA on 6/3/24 suggestive of UTI. No specific urinary symptoms. Has noticed cognitive changes over the past week.  Urine culture pending.  Discussed possible UTI with patient and her son, ultimately favor treating with antibiotics. Rocephin started on 6/4/24, can transition to oral antibiotic upon discharge.    Cardiac murmur  Aortic stenosis, ruled out  Noted systolic murmur in the aortic region. Patient reports she has had this for years. No recent cardiac symptoms. No prior echo available in epic.  - TTE on 6/1/2024 showed LVEF 65 to 70%, mild to moderate tricuspid regurg, mild pulmonary hypertension, at least mild aortic stenosis - see report in epic.  - Repeat limited TTE on 6/3/2024 showed no hemodynamically significant valvular aortic stenosis.    Generalized Weakness  Reports recent fatigue. Wheelchair bound, transfers herself to the toilet.   - PT consulted, appreciate their assistance, recommending TCU vs home with assistance.  - Patient and her son considered TCU, however ultimately she would like to be discharged back to [facility] with increased services and home care.  - SW for discharge planning, appreciate their assistance.  BASELINE PRIOR TO ADMISSION  1.  Living Situation:  [facility]  2. Ambulation:  wheelchair  3.  Mental status:  alert and oriented X4  4. Diet: regular   5.  Family contact:  Extended Emergency Contact Information  Primary Emergency Contact: Cody Manrique  Mobile Phone: [phone]  Relation: Son     Depression  - Continue PTA Lexapro.    Paroxsymal Atrial Fibrillation  History of DVT post surgery  *Recent LE US negative.  - PTA Xarelto held as noted above, resumed on 6/3/24.  - Continue PTA Metoprolol with hold parameters.    PMR  - No acute issues. Baseline chronic hip pain. Not on steroids.           Diet: Regular Diet Adult    DVT Prophylaxis:  Pneumatic Compression Devices  Arguelles Catheter: Not present  Lines: None     Cardiac Monitoring: None  Code Status: No CPR- Do NOT Intubate      Clinically Significant Risk Factors              # Hypoalbuminemia: Lowest albumin = 3.3 g/dL at 6/3/2024  6:58 AM, will monitor as appropriate                   # Moderate Malnutrition: based on nutrition assessment, PRESENT ON ADMISSION   # Financial/Environmental Concerns: none         Disposition Plan     Medically Ready for Discharge: Anticipated Tomorrow             Charli Cartwright MD  Hospitalist Service  [facility]  Securely message with [employer] (more info)  Text page via [facility] Paging/Directory   ______________________________________________________________________    Interval History   Nallely Manrique was seen this morning.  She feels okay.  Continues to have 1-2 soft bowel movements per day, no melena or hematochezia.  Denies fevers, lightheadedness, chest pain, shortness of breath, nausea, abdominal pain.  Some shoulder discomfort over the past few days, no neck pain or radicular symptoms.  Denies any urinary symptoms.  Had been planning for transitional care, however now planning to discharge back to [facility] with increased services at home care.  Discussed plan of care with her son, Cody, by phone today.    Physical Exam   Vital Signs: Temp: 98.1  F (36.7  C) Temp src: Oral BP: 138/59 Pulse: 69   Resp: 16 SpO2: 96 % O2 Device: None (Room air)    Weight: 115 lbs 15.39 oz    Constitutional: awake, alert, cooperative, no apparent distress, laying in the hospital bed with the head of the bed slightly elevated  Respiratory: no increased work of breathing, clear to auscultation bilaterally, no crackles or wheezing  Cardiovascular: regular rate and rhythm, no murmur noted  GI: bowel sounds positive, soft, non-distended, non-tender  Skin: warm, dry, pale  Musculoskeletal: no lower extremity pitting edema present  Neurologic: awake,  alert, answers questions appropriately, moves all extremities    Medical Decision Making       45 MINUTES SPENT BY ME on the date of service doing chart review, history, exam, documentation & further activities per the note.      Data     I have personally reviewed the following data over the past 24 hrs:    9.4  \   8.2 (L)   / 705 (H)     N/A N/A N/A /  N/A   N/A N/A N/A \

## 2024-06-04 NOTE — CONSULTS
"CLINICAL NUTRITION SERVICES  -  ASSESSMENT NOTE    Recommendations Ordered by Registered Dietitian (RD):   Offered supplements -pt declined   Thera vit M added    Malnutrition:   % Weight Loss:  None noted  % Intake:  <75% for >/= 3 months (moderate malnutrition)  Subcutaneous Fat Loss:  Orbital region moderate depletion and Upper arm region mild depletion  Muscle Loss:  Temporal region moderate depletion and Clavicle bone region mild depletion  Fluid Retention:  None noted    Malnutrition Diagnosis: Moderate malnutrition  In Context of:  Acute illness or injury     REASON FOR ASSESSMENT  Nallely Manrique is a 89 year old female seen by Registered Dietitian for Provider Order - \"concern for malnutrition\"     NUTRITION HISTORY  - Information obtained from chart review, patient report.   - Chronic diarrhea since 7/2023. Admitted with anemia and concern for acute GIB.   - comes from [facility] where she has assistance w/ meals.     - Patient reports a decrease in intake in response to loose stooling- estimates she eats 50% of her baseline at times, other times eats well.   - She has meals provided at her living facility - typically she has lunch and dinner in the dining room.   - She doesn't weigh herself, but reports a UBW around 115#.   - Does not drink ensure/boost, has no interest in trying them.     CURRENT NUTRITION ORDERS  Diet Order:     Regular     Current Intake/Tolerance:  Consuming % of meals this admission. Pt feels she is eating well - declined supplements at this time.     NUTRITION FOCUSED PHYSICAL ASSESSMENT FOR DIAGNOSING MALNUTRITION)  Yes         Observed:    Muscle wasting (refer to documentation in Malnutrition section) and Subcutaneous fat loss (refer to documentation in Malnutrition section)    ANTHROPOMETRICS  Height: 5' 5\"  Weight: 115 lbs 15.39 oz (52.6 kg)   Body mass index is 19.3 kg/m .  Weight Status:  Normal BMI  IBW: 56.8 kg   % IBW: 93%  Weight History:   Wt Readings from Last 10 " Encounters:   06/04/24 52.6 kg (115 lb 15.4 oz)   12/21/23 51.7 kg (114 lb)     LABS  Labs reviewed    MEDICATIONS  Medications reviewed  Folic Acid 1 mg     ASSESSED NUTRITION NEEDS PER APPROVED PRACTICE GUIDELINES:  Dosing Weight 53 kg   Estimated Energy Needs: 6994-9263 kcals (25-30 Kcal/Kg)  Justification: maintenance  Estimated Protein Needs: 65-80 grams protein (1.2-1.5 g pro/Kg)  Justification: preservation of lean body mass  Estimated Fluid Needs: 1 mL/kcal   Justification: maintenance    MALNUTRITION:  % Weight Loss:  None noted  % Intake:  <75% for >/= 3 months (moderate malnutrition)  Subcutaneous Fat Loss:  Orbital region moderate depletion and Upper arm region mild depletion  Muscle Loss:  Temporal region moderate depletion and Clavicle bone region mild depletion  Fluid Retention:  None noted    Malnutrition Diagnosis: Moderate malnutrition  In Context of:  Acute illness or injury    NUTRITION DIAGNOSIS:  Inadequate oral intake related to decreased oral intake recently to reduce loose stooling as evidenced by patient estimates she eats as little as 50% of her meals.     NUTRITION INTERVENTIONS  Recommendations / Nutrition Prescription  Regular diet   Offered supplements -pt declined   Thera vit M added     Implementation  Nutrition education: Provided education - encouraged intakes.   Multivitamin/Mineral: ordered    Nutrition Goals  Intake of >50% adequate trays TID.    MONITORING AND EVALUATION:  Progress towards goals will be monitored and evaluated per protocol and Practice Guidelines    Lin Dubose RD, LD  Pager: [phone]

## 2024-06-04 NOTE — PROGRESS NOTES
Gastroenterology Follow Up Note    Nallely Manrique MRN#  [id number]   Age:  89 year old YOB: 1935    Date of Admission:  5/31/2024     Subjective   No acute events overnight.  Reports one one bowel movement today of soft consistency stools.  Only one bowel movement noted yesterday as well.  No evidence of melena or pawan hematochezia.  Denies any abdominal pain, nausea, or emesis.  Tolerating diet without any difficulties.  Hemodynamically stable.       MEDICATIONS & ALLERGIES   Current medication list reviewed.      No Known Allergies       OBJECTIVE   Vitals /55 (BP Location: Left arm)   Pulse 65   Temp 99.1  F (37.3  C) (Oral)   Resp 16   Wt 52.6 kg (115 lb 15.4 oz)   SpO2 96%   BMI 19.30 kg/m          General:   Well-developed elderly CF in NAD.   HEENT:   NC/AT. MMM.   Lungs:  No respiratory distress.   Heart:  RRR. +S1, S2.    Abdomen:   Soft. NT/ND. BS active.    Ext/MS:   No cyanosis or erythema.    Neuro:   No focal deficits noted.    Skin:  No obvious rashes or lesions noted.         LABORATORY AND IMAGING    ELECTROLYTE PANEL   Recent Labs   Lab Test 06/03/24  0658 06/01/24  0629 05/31/24  1037    138 137   POTASSIUM 4.1 4.3 4.4   BUN 13.6 12.3 16.8   CR 0.60 0.65 0.73   DANNY 8.7* 8.8 9.3      HEMATOLOGY PANEL   Recent Labs   Lab Test 06/04/24  0633 06/03/24  0658 06/02/24  0633   WBC 9.4 8.2 8.8   HGB 8.2* 8.4* 7.9*   MCV 95 97 97   * 717* 745*      LIVER AND PANCREAS PANEL   Recent Labs   Lab Test 06/03/24  1738 06/03/24  0658 05/31/24  1037 12/21/23  1103   ALBUMIN  --  3.3* 3.7 3.7   BILITOTAL  --  0.4 0.6 0.4   ALT  --  <5 <5 7   AST  --  18 27 19   PROTEIN 30*  --   --   --       I have reviewed the current diagnostic and laboratory tests.     Assessment / Recommendations:     Assessment:  Acute on chronic anemia.  Hemoglobin has remained stable without any further drop.  No overt GI bleeding and fecal occult blood test negative.  Low suspicion for  clinically significant GI blood loss at this time.  Hematology following with some concern for hemolytic anemia.  Chronic diarrhea prior to admission.  Appears to have resolved.  On average only one bowel movement per 24 hours over the past two days.  Stool enteric pathogen panel and fecal calprotectin negative.  Last colonoscopy many years ago with reported normal findings.      Recommendations:  No indication for urgent endoscopic evaluation.  If patient were to develop overt GI bleeding, then would consider EGD and/or colonoscopy as clinically indicated.  Monitor hemoglobin and transfuse as needed.  Ok to resume anticoagulation from GI standpoint.  GI team to sign off.  Please feel free to contact with any questions or concerns.       Total time spent in chart review, direct medical discussion, examination, and documentation was 15 minutes.    Tae Young MD  [facility]  [phone]  I appreciate the opportunity to participate in the care of this patient.   Please feel free to call me with any questions or concerns.

## 2024-06-05 VITALS
TEMPERATURE: 97.9 F | DIASTOLIC BLOOD PRESSURE: 64 MMHG | HEART RATE: 74 BPM | BODY MASS INDEX: 19.3 KG/M2 | OXYGEN SATURATION: 97 % | SYSTOLIC BLOOD PRESSURE: 143 MMHG | RESPIRATION RATE: 18 BRPM | WEIGHT: 115.96 LBS

## 2024-06-05 LAB — BACTERIA UR CULT: NORMAL

## 2024-06-05 PROCEDURE — 250N000013 HC RX MED GY IP 250 OP 250 PS 637: Performed by: PHYSICIAN ASSISTANT

## 2024-06-05 PROCEDURE — 250N000013 HC RX MED GY IP 250 OP 250 PS 637: Performed by: HOSPITALIST

## 2024-06-05 PROCEDURE — 250N000013 HC RX MED GY IP 250 OP 250 PS 637: Performed by: INTERNAL MEDICINE

## 2024-06-05 PROCEDURE — 99239 HOSP IP/OBS DSCHRG MGMT >30: CPT | Performed by: HOSPITALIST

## 2024-06-05 RX ADMIN — ACETAMINOPHEN 650 MG: 325 TABLET, FILM COATED ORAL at 07:47

## 2024-06-05 RX ADMIN — MIRABEGRON 25 MG: 25 TABLET, FILM COATED, EXTENDED RELEASE ORAL at 07:46

## 2024-06-05 RX ADMIN — FOLIC ACID 1 MG: 1 TABLET ORAL at 07:47

## 2024-06-05 RX ADMIN — Medication 1 TABLET: at 07:46

## 2024-06-05 RX ADMIN — DONEPEZIL HYDROCHLORIDE 5 MG: 5 TABLET, FILM COATED ORAL at 07:47

## 2024-06-05 RX ADMIN — GABAPENTIN 200 MG: 100 CAPSULE ORAL at 07:46

## 2024-06-05 RX ADMIN — METOPROLOL TARTRATE 25 MG: 25 TABLET, FILM COATED ORAL at 07:47

## 2024-06-05 RX ADMIN — ESCITALOPRAM OXALATE 10 MG: 10 TABLET ORAL at 07:46

## 2024-06-05 RX ADMIN — LIDOCAINE 1 PATCH: 4 PATCH TOPICAL at 07:46

## 2024-06-05 RX ADMIN — ACETAMINOPHEN 650 MG: 325 TABLET, FILM COATED ORAL at 01:57

## 2024-06-05 ASSESSMENT — ACTIVITIES OF DAILY LIVING (ADL)
ADLS_ACUITY_SCORE: 40

## 2024-06-05 NOTE — PLAN OF CARE
PRIMARY Concern: Pt here with c/o of diarrhea, black stools   SAFETY RISK Concerns (fall risk, behaviors, etc.): Fall risk   Isolation/Type: None   Tests/Procedures for NEXT shift: AM labs  Consults? (Pending/following, signed-off?) singed-off completed   Where is patient from? (Home, TCU, etc.): [facility]  Other Important info for NEXT shift: patient uses wheelchair at baseline, has own wheelchair in room.  Anticipated DC date & active delays: possibly discharge today     SUMMARY NOTE:                Orientation/Cognitive: A/Ox4, forgetful, [place]  Observation Goals (Met/ Not Met): Inpt   Mobility Level/Assist Equipment: Ax1-2 pivot to BSC.  Antibiotics & Plan (IV/po, length of tx left): Rocephin q24  Pain Management: R shoulder pain-- [place] lidocaine patch/gabapentin. PRN tylenol x1  Tele/VS/O2: VSS RA  ABNL Lab/BG: Hbg 8.2  Diet: Reg  Bowel/Bladder: Cont of B&B  Skin Concerns: Blanchable redness on bottom-- mepilex in place  Drains/Devices: PIV SL  Patient Stated Goal for Today:sleep

## 2024-06-05 NOTE — PLAN OF CARE
Physical Therapy Discharge Summary    Reason for therapy discharge:    Discharged to home with home therapy.    Progress towards therapy goal(s). See goals on Care Plan in [facility] electronic health record for goal details.  Goals partially met.  Barriers to achieving goals:   discharge from facility.    Therapy recommendation(s):    Continued therapy is recommended.  Rationale/Recommendations:  progress indep with mobility in home setting.

## 2024-06-05 NOTE — DISCHARGE SUMMARY
[facility]  Hospitalist Discharge Summary      Date of Admission:  5/31/2024  Date of Discharge:  6/5/2024  Discharging Provider: Charli Cartwright MD  Discharge Service: Hospitalist Service    Discharge Diagnoses   Persistent Diarrhea, improved  Acute on chronic borderline macrocytic anemia  Chronic thrombocytosis  Chronic melena, but suspect related to iron supplementation  Mild acute encephalopathy  Cognitive impairment   Possible UTI  Cardiac murmur  Aortic stenosis, ruled out  Generalized Weakness  Depression  Paroxsymal Atrial Fibrillation  History of DVT post surgery  PMR    Clinically Significant Risk Factors     # Moderate Malnutrition: based on nutrition assessment      Follow-ups Needed After Discharge   Follow-up Appointments     Follow-up and recommended labs and tests       Follow up with primary care provider, Calvin Young, within 7 days for   hospital follow up.  The following labs/tests are recommended: CBC and   BMP.  Follow up with Hematology in 2-4 weeks, the clinic will be contacting you   in the next few days to arrange an appointment. You were seen by Dr. Lloyd and Dr. Dias while in the hospital. If you have any questions   about scheduling, you can contact their office at [phone].  Follow up with Neurology in 1-2 months. You were seen by Dr. Isaiah Pathak MD while in the hospital. Please call [phone] to   schedule an appointment.            Discharge Disposition   Discharged to assisted living  Condition at discharge: Stable    Hospital Course   Nallely Manrique is a 89 year old female admitted on 5/31/2024 with a PMH significant for polymyalgia rheumatica, DVT post hip replacement, paroxysmal Afib, HTN, HLD, COPD who presented from [facility] for further evaluation of anemia after having a Hgb 7.1 earlier this week. Note baseline Hgb 8-9 with chronic thrombocytosis dating back to 1/2023. During interview, son actually reports more  concern about pt's persistent diarrhea that has been ongoing since 7/2023. Registered under observation status for further evaluation and treatment.     Persistent Diarrhea, improved  Diarrhea since hip replacement in July '23. Prior colonoscopy years ago reported as normal. No known history of IBD. ? Microscopic colitis? CT A/P 12/2023 negative for acute pathology, extensive colonic diverticulosis, no diverticulitis.   - Registered to observation initially, admitted to inpatient on 6/1/24.  - [facility] consulted, appreciate their assistance.    - Enteric panel negative on 5/31/24.  - ESR elevated at 51.  - Calprotectin at upper end of normal.  - Celiac panel negative.  - Monitor and record stool output.  - Diarrhea improved.  No plans for EGD/colonoscopy at this time.  Can follow up with [facility] as an outpatient if needed for recurrent diarrhea or if there is concern for bleeding (currently low suspicion).    Acute on chronic borderline macrocytic anemia  Chronic thrombocytosis  Chronic melena, but suspect related to iron supplementation  No evidence of active GI bleed, vaginal bleed or hematuria. No hx of PUD, not GERD. On Xarelto with last dose the evening prior to admission. Takes iron supplementation. No abdominal pain, change in appetite. Not on special diet. Prior colonoscopy years ago reported as normal. No known hematologic disorder  *Hemoglobin since admission 8.1 --> 7.6, was down to 7.1 earlier in the week; baseline hemoglobin dating back to 1/2023 is in 8-9 range.  *Platelets elevated since admission 700-800 range; baseline platelets back to 1/2023 have been 500-900 range.  *Iron studies, TSH normal, folate low normal, B12 normal. Hemoccult negative.  - Peripheral smear did not show significant evidence of hemolysis.  - Haptoglobin low, LDH elevated. Concerning for hemolysis, however bilirubin within normal limits.  - Hemoglobin stable at 8.2 on 6/4/24.  - [facility] consulted, appreciate their  assistance.  - HERB negative on 6/1/24, suggesting non-immune hemolysis.  - STFR slightly elevated.  - PNH, G6PD negative.  - SPEP with no obvious monoclonal protein. FLC marginally elevated, but high normal lambda free light chains which makes primary light chain disorder unlikely.  - Further labs pending (MPL CALR NGS Reflex Panel, JAK2).  - [facility] consulted, appreciate their assistance. No plan for EGD/colonoscopy at this time.  - Conditional orders for transfusion if hemoglobin < 7, but she did not require transfusion during this admission.  - PTA Xarelto held initially during evaluation of anemia, resumed on evening of 6/3/24 as no obvious bleeding noted.  - Will start prenatal vitamin upon discharge per Hematology recommendations to cover for potential nutritional causes (iron, B12, folate).  - Follow-up in hematology clinic for ongoing evaluation/management    Mild acute encephalopathy  Cognitive impairment   *Son reports cognitive decline after surgery in July '23, but gradually improved afterward until about a week ago when he noticed worsening memory deficits.  - CT head on 5/31/2024 showed no acute changes, did show brain atrophy and advanced presumed sequelae of chronic microvascular ischemic disease.    - Continue PTA Aricept.  - OT consulted for cognitive evaluation, she scored 19/30 on SLUMS testing.  - Appears that this a significant change in her cognition over the past 7-10 days. No other obvious focal neurologic deficits. B12 and folate within normal limits. TSH within normal limits. No obvious infectious symptoms noted, but UA on 6/3/24 suggestive of UTI as noted below.  - Neurology consulted, appreciate their assistance.  - Follow-up in neurology clinic in 1 to 2 months.    Possible UTI  UA on 6/3/24 suggestive of UTI. No specific urinary symptoms. Has noticed cognitive changes over the past week.  - Urine culture grew mixed urogenital otto.  - Discussed possible UTI with patient and her son,  ultimately they favored treating with antibiotics. Rocephin started on 6/4/24, transition to oral Omnicef upon discharge to complete a 5-day course of antibiotics.    Cardiac murmur  Aortic stenosis, ruled out  Noted systolic murmur in the aortic region. Patient reports she has had this for years. No recent cardiac symptoms. No prior echo available in epic.  - TTE on 6/1/2024 showed LVEF 65 to 70%, mild to moderate tricuspid regurg, mild pulmonary hypertension, at least mild aortic stenosis - see report in epic.  - Repeat limited TTE on 6/3/2024 showed no hemodynamically significant valvular aortic stenosis.    Generalized Weakness  Reports recent fatigue. Wheelchair bound, transfers herself to the toilet.   - PT consulted, appreciate their assistance, recommending TCU vs home with assistance.  - Patient and her son considered TCU, however ultimately she wanted to be discharged back to [facility] with increased services and home care.  - SW for discharge planning, appreciate their assistance.  BASELINE PRIOR TO THIS ADMISSION  1.  Living Situation:  [facility], [facility]  2. Ambulation:  wheelchair  3.  Mental status:  alert and oriented X4  4. Diet: regular   5.  Family contact:  Extended Emergency Contact Information  Primary Emergency Contact: Cody Manrique  Mobile Phone: [phone]  Relation: Son     Depression  - Continue PTA Lexapro.    Paroxsymal Atrial Fibrillation  History of DVT post surgery  *Recent LE US negative.  - PTA Xarelto held initially as noted above, resumed on 6/3/24, continue the same upon discharge.  - Continue PTA Metoprolol with hold parameters.    PMR  - Baseline chronic hip pain. Not on steroids.   - Had some mild bilateral shoulder pain on the last couple days of her admission.  No radicular symptoms.  Left shoulder discomfort resolved prior to discharge.  Recommended continued symptomatic management, consider further evaluation if not improved at follow up.    Consultations This  Hospital Stay   GASTROENTEROLOGY IP CONSULT  HEMATOLOGY & ONCOLOGY IP CONSULT  PHYSICAL THERAPY ADULT IP CONSULT  CARE MANAGEMENT / SOCIAL WORK IP CONSULT  OCCUPATIONAL THERAPY ADULT IP CONSULT  NUTRITION SERVICES ADULT IP CONSULT  NEUROLOGY IP CONSULT  PHYSICAL THERAPY ADULT IP CONSULT  CARE MANAGEMENT / SOCIAL WORK IP CONSULT    Code Status   No CPR- Do NOT Intubate    Time Spent on this Encounter   I, Charli Cartwright MD, personally saw the patient today and spent greater than 30 minutes discharging this patient.       Charli Cartwright MD  [facility] EXTENDED RECOVERY AND SHORT STAY  [address] [place] [address]  Phone: [phone]  ______________________________________________________________________    Physical Exam   Vital Signs: Temp: 97.9  F (36.6  C) Temp src: Oral BP: (!) 143/64 Pulse: 74   Resp: 18 SpO2: 97 % O2 Device: None (Room air)    Weight: 115 lbs 15.39 oz  Constitutional: awake, alert, cooperative, no apparent distress, laying in the hospital bed with the head of the bed slightly elevated  Respiratory: no increased work of breathing, clear to auscultation bilaterally, no crackles or wheezing  Cardiovascular: regular rate and rhythm, no murmur noted  GI: bowel sounds positive, soft, non-distended, non-tender  Skin: warm, dry, pale  Musculoskeletal: no lower extremity pitting edema present  Neurologic: awake, alert, answers questions appropriately, moves all extremities       Primary Care Physician   Calvin Young    Discharge Orders      Home Care Referral      Adult Oncology/Hematology  Referral      Reason for your hospital stay    Anemia, multifactorial.  Diarrhea.  UTI.     Activity    Your activity upon discharge: activity as tolerated with assistance and assistive device     Follow-up and recommended labs and tests     Follow up with primary care provider, Calvin Young, within 7 days for hospital follow up.  The following labs/tests are  recommended: CBC and BMP.  Follow up with Hematology in 2-4 weeks, the clinic will be contacting you in the next few days to arrange an appointment. You were seen by Dr. Lloyd and Dr. Dias while in the hospital. If you have any questions about scheduling, you can contact their office at [phone].  Follow up with Neurology in 1-2 months. You were seen by Dr. Isaiah Pathak MD while in the hospital. Please call [phone] to schedule an appointment.     Diet    Follow this diet upon discharge: Regular Diet Adult     Significant Results and Procedures   Most Recent 3 CBC's:  Recent Labs   Lab Test 06/04/24  0633 06/03/24  0658 06/02/24  0633   WBC 9.4 8.2 8.8   HGB 8.2* 8.4* 7.9*   MCV 95 97 97   * 717* 745*     Most Recent 3 BMP's:  Recent Labs   Lab Test 06/03/24  0806 06/03/24  0658 06/02/24  0159 06/01/24  0629 05/31/24  1037   NA  --  136  --  138 137   POTASSIUM  --  4.1  --  4.3 4.4   CHLORIDE  --  101  --  102 101   CO2  --  26  --  27 28   BUN  --  13.6  --  12.3 16.8   CR  --  0.60  --  0.65 0.73   ANIONGAP  --  9  --  9 8   DANNY  --  8.7*  --  8.8 9.3   GLC 94 94 105* 93 91     Most Recent 2 LFT's:  Recent Labs   Lab Test 06/03/24  0658 05/31/24  1037   AST 18 27   ALT <5 <5   ALKPHOS 119 128   BILITOTAL 0.4 0.6     Results for orders placed or performed during the hospital encounter of 05/31/24   CT Head w/o Contrast    Narrative    EXAM: CT HEAD W/O CONTRAST  5/31/2024 2:10 PM     HISTORY: increased confusion. Recurrent falls on Xarelto       COMPARISON: None    TECHNIQUE: Using multidetector thin collimation helical acquisition  technique, axial, coronal and sagittal CT images from the skull base  to the vertex were obtained without intravenous contrast.   (topogram) image(s) also obtained and reviewed. Dose reduction  techniques were used.    FINDINGS:  No acute intracranial hemorrhage, mass effect, or midline shift.  Subcortical, periventricular areas of white matter  hypoattenuation,  advanced parenchymal volume loss and compensatory ventricular  dilatation compatible with sequelae of chronic microvascular ischemic  disease. Preserved gray-white matter differentiation. Dense basal  ganglia calcifications.    Atraumatic calvarium. No substantial paranasal sinus mucosal disease.  Clear mastoid air cells. Nonfocal orbits.       Impression    IMPRESSION:   1. No evidence for acute intracranial hemorrhage, hydrocephalus or  transcortical infarct. No skull fracture.  2. Brain atrophy and advanced presumed sequela of chronic  microvascular ischemic disease.    GEE SYKES DO         SYSTEM ID:  [id number]   Echocardiogram Complete     Value    LVEF  65-70%    [facility]    [id number]  [id number]  [id number]  [id number]^ELIZABETH^MAR^SUJATHA     [facility]  Echocardiography Laboratory  [address] [place] [address]     Name: ERICK GRIMM  MRN: [id number]  : 1935  Study Date: 2024 03:36 PM  Age: 89 yrs  Gender: Female  Patient Location: [facility]  Reason For Study: Murmur  Ordering Physician: MAR JOHNSON  Performed By: JOMAR     BSA: 1.6 m2  Height: 65 in  Weight: 118 lb  HR: 58  BP: 133/52 mmHg  ______________________________________________________________________________  Procedure  Complete Portable Echo Adult. Optison (NDC #[id number]) given intravenously.  ______________________________________________________________________________  Interpretation Summary     The visual ejection fraction is 65-70%.  Diastolic Doppler findings (E/E' ratio and/or other parameters) suggest left  ventricular filling pressures are increased.  There is moderate to severe mitral annular calcification.  There is mild to moderate (1-2+) tricuspid regurgitation.  Mild (35-45mmHg) pulmonary hypertension is present.  The aortic valve is abnormal but not well seen. There is trace aortic valve  insufficiency. By doppler there is only mild aortic valve  stenosis-- I have  some concern the doppler may be underestimating the degree of aortic stenosis.  If clinically indicated please request a limited repeat echo during weekday to  re-confirm degree of aortic valve stenosis  ______________________________________________________________________________  Left Ventricle  The left ventricle is normal in size. There is borderline concentric left  ventricular hypertrophy. The visual ejection fraction is 65-70%. Diastolic  Doppler findings (E/E' ratio and/or other parameters) suggest left ventricular  filling pressures are increased. Diastolic function not assessed due to  significant mitral annular calcification. Normal left ventricular wall motion.     Right Ventricle  The right ventricle is normal in size and function.     Atria  The left atrium is moderately dilated. Right atrial size is normal.     Mitral Valve  There is moderate to severe mitral annular calcification. There is trace  mitral regurgitation. There is no mitral valve stenosis.     Tricuspid Valve  There is mild to moderate (1-2+) tricuspid regurgitation. The right  ventricular systolic pressure is approximated at 35.2 mmHg plus the right  atrial pressure. IVC diameter <2.1 cm collapsing >50% with sniff suggests a  normal RA pressure of 3 mmHg. Mild (35-45mmHg) pulmonary hypertension is  present.     Aortic Valve  The aortic valve is abnormal but not well seen. There is trace aortic valve  insufficiency. By doppler there is only mild aortic valve stenosis-- I have  some concern the doppler may be underestimating the degree of aortic stenosis.  If clinically indicated please request a limited repeat echo during weekday to  re-confirm degree of aortic valve stenosis.     Pulmonic Valve  The pulmonic valve is not well seen, but is grossly normal.     Vessels  (The upper limit of normal is 3.7cm for aortic root diameter.). The aortic  root is normal size. The inferior vena cava was normal in size with  preserved  respiratory variability.     Pericardium  The pericardium appears normal.     Rhythm  Sinus rhythm was noted.  ______________________________________________________________________________  MMode/2D Measurements & Calculations     IVSd: 0.97 cm  LVIDd: 3.6 cm  LVIDs: 2.2 cm  LVPWd: 1.2 cm  FS: 38.9 %  LV mass(C)d: 120.5 grams  LV mass(C)dI: 76.2 grams/m2  Ao root diam: 3.4 cm  LA dimension: 4.4 cm  asc Aorta Diam: 3.6 cm  LA/Ao: 1.3  Ao root diam index Ht(cm/m): 2.0  Ao root diam index BSA (cm/m2): 2.1  Asc Ao diam index BSA (cm/m2): 2.3  Asc Ao diam index Ht(cm/m): 2.2  RWT: 0.64     Doppler Measurements & Calculations  MV E max dheeraj: 98.5 cm/sec  MV A max dheeraj: 88.1 cm/sec  MV E/A: 1.1  MV dec time: 0.16 sec     Ao V2 max: 162.0 cm/sec  Ao max PG: 10.0 mmHg  Ao V2 mean: 110.0 cm/sec  Ao mean P.0 mmHg  Ao V2 VTI: 38.8 cm  LV V1 max P.2 mmHg  LV V1 max: 102.0 cm/sec  LV V1 VTI: 21.6 cm  PA acc time: 0.13 sec  TR max dheeraj: 296.5 cm/sec  TR max P.2 mmHg  AV Dheeraj Ratio (DI): 0.63  E/E' av.1  Lateral E/e': 14.8  Medial E/e': 19.3     ______________________________________________________________________________  Report approved by: Aditya Negron 2024 11:24 AM         Echocardiogram Limited     Value    LVEF  60-65%    Narrative    [id number]  [id number]  [id number]  [id number]^ALAN^SHIRA^SERGIO     [facility]  Echocardiography Laboratory  [address] [place] [address]     Name: ERICK GRIMM  MRN: [id number]  : 1935  Study Date: 2024 09:40 AM  Age: 89 yrs  Gender: Female  Patient Location: [facility]  Reason For Study: Aortic Valve Disorder  Ordering Physician: SHIRA PHILLIPS  Referring Physician: Calvin Young  Performed By: Cindi Ambriz     BSA: 1.6 m2  Height: 65 in  Weight: 114 lb  HR: 72  BP: 119/55 mmHg  ______________________________________________________________________________  Procedure  Limited Portable Echo Adult.  Optison (NDC #3246-6230) given intravenously.  ______________________________________________________________________________  Interpretation Summary     No hemodynamically significant valvular aortic stenosis.  No aortic regurgitation is present.  The visual ejection fraction is 60-65%.  Left ventricular systolic function is normal.  Technically difficult study due to the presence of breast implants. The study  was technically difficult. The study was technically limited.  ______________________________________________________________________________  Left Ventricle  The visual ejection fraction is 60-65%. Left ventricular systolic function is  normal.     Right Ventricle  The right ventricle is normal in size and function.     Atria  The left atrium is moderately dilated. Right atrial size is normal.     Mitral Valve  There is moderate to severe mitral annular calcification.     Tricuspid Valve  There is trace tricuspid regurgitation.     Aortic Valve  Thickened aortic valve leaflets. No aortic regurgitation is present. The  calculated aortic valve are is 2.0 cm^2. The peak AoV pressure gradient is  11.0 mmHg. The mean AoV pressure gradient is 5.9 mmHg. No hemodynamically  significant valvular aortic stenosis.     Pulmonic Valve  There is no pulmonic valvular regurgitation.     Vessels  IVC diameter <2.1 cm collapsing >50% with sniff suggests a normal RA pressure  of 3 mmHg.     Pericardium  Trivial pericardial effusion.     ______________________________________________________________________________  MMode/2D Measurements & Calculations  Ao root diam: 3.0 cm  asc Aorta Diam: 3.4 cm  LVOT diam: 2.0 cm  LVOT area: 3.2 cm2  Ao root diam index Ht(cm/m): 1.8     Ao root diam index BSA (cm/m2): 1.9  Asc Ao diam index BSA (cm/m2): 2.2  Asc Ao diam index Ht(cm/m): 2.1     Doppler Measurements & Calculations  MV max P.5 mmHg  MV mean P.0 mmHg  MV V2 VTI: 33.6 cm  MVA(VTI): 2.0 cm2  Ao V2 max: 176.1 cm/sec  Ao  max P.0 mmHg  Ao V2 mean: 115.9 cm/sec  Ao mean P.9 mmHg  Ao V2 VTI: 33.4 cm  NANCY(I,D): 2.0 cm2  NANCY(V,D): 1.8 cm2  LV V1 max P.1 mmHg  LV V1 max: 101.4 cm/sec  LV V1 VTI: 21.1 cm  SV(LVOT): 67.0 ml  SI(LVOT): 43.0 ml/m2  AV Dheeraj Ratio (DI): 0.58  NANCY Index (cm2/m2): 1.3     ______________________________________________________________________________  Report approved by: Aditya Swanson 2024 11:18 AM           Discharge Medications   Current Discharge Medication List        START taking these medications    Details   cefdinir (OMNICEF) 300 MG capsule Take 1 capsule (300 mg) by mouth 2 times daily for 4 days  Qty: 8 capsule, Refills: 0    Associated Diagnoses: Acute cystitis without hematuria      Prenatal Vit-Fe Fumarate-FA (PRENATAL VITAMIN) 27-0.8 MG TABS Take 1 tablet by mouth every 30 days  Qty: 30 tablet, Refills: 0    Associated Diagnoses: Anemia, unspecified type           CONTINUE these medications which have CHANGED    Details   ferrous sulfate (FE TABS) 325 (65 Fe) MG EC tablet Take 1 tablet (325 mg) by mouth every other day           CONTINUE these medications which have NOT CHANGED    Details   acetaminophen (TYLENOL) 325 MG tablet Take 650 mg by mouth 2 times daily      calcium citrate-vitamin D (CITRACAL) 200-6.25 MG-MCG TABS per tablet Take 2 tablets by mouth 2 times daily      !! dimethicone-zinc oxide (JUAN PROTECT) external cream Apply topically 2 times daily Affected area rash      !! dimethicone-zinc oxide (JUAN PROTECT) external cream Apply topically 4 times daily as needed for dry skin or other Apply to affected areas topically as needed for masd after cleansing      diphenhydrAMINE-zinc acetate (BENADRYL) 2-0.1 % external cream Apply topically 2 times daily as needed for itching      donepezil (ARICEPT) 5 MG tablet Take 5 mg by mouth daily      ergocalciferol (ERGOCALCIFEROL) 1.25 MG (21342 UT) capsule Take 50,000 Units by mouth once a week On        escitalopram (LEXAPRO) 10 MG tablet Take 10 mg by mouth daily      gabapentin (NEURONTIN) 100 MG capsule Take 200 mg by mouth 3 times daily      lactobacillus rhamnosus, GG, (CULTURELL) capsule Take 2 capsules by mouth daily      Lidocaine (LIDOCARE) 4 % Patch Place 1 patch onto the skin every 24 hours To prevent lidocaine toxicity, patient should be patch free for 12 hrs daily.Remove in PM.      metoprolol tartrate (LOPRESSOR) 25 MG tablet Take 25 mg by mouth 2 times daily      !! mineral oil-white petrolatum (EUCERIN/MINERIN) cream Apply topically 2 times daily Apply to bilateral forearms      mirabegron (MYRBETRIQ) 25 MG 24 hr tablet Take 25 mg by mouth daily      polyethylene glycol (MIRALAX) 17 g packet Take 1 packet by mouth daily as needed for constipation      rivaroxaban ANTICOAGULANT (XARELTO) 20 MG TABS tablet Take 20 mg by mouth daily       !! - Potential duplicate medications found. Please discuss with provider.        Allergies   No Known Allergies

## 2024-06-05 NOTE — PLAN OF CARE
PRIMARY Concern: Pt here with c/o of diarrhea, black stools   SAFETY RISK Concerns (fall risk, behaviors, etc.): Fall risk   Isolation/Type: None   Tests/Procedures for NEXT shift: AM labs  Consults? (Pending/following, signed-off?) singed-off completed   Where is patient from? (Home, TCU, etc.): [facility]  Other Important info for NEXT shift: patient uses wheelchair at baseline, has own wheelchair in room.  Anticipated DC date & active delays: possibly discharge today      SUMMARY NOTE:                Orientation/Cognitive: A/Ox4, forgetful, [place]  Observation Goals (Met/ Not Met): Inpt   Mobility Level/Assist Equipment: Ax1-2 pivot to BSC.  Antibiotics & Plan (IV/po, length of tx left): Rocephin q24  Pain Management: R shoulder pain-- [place] lidocaine patch/gabapentin. PRN tylenol x1  Tele/VS/O2: VSS RA  ABNL Lab/BG: Hbg 8.2  Diet: Reg  Bowel/Bladder: Cont of B&B  Skin Concerns: Blanchable redness on bottom-- mepilex in place  Drains/Devices: PIV SL  Patient Stated Goal for Today:sleep        VSS RA, no complaints of chest pain or SOB. PIV removed. AVS was gone through with patient, all questions and concerns answered. All belongings were taken with the patient. Patient was sent for discharge to be picked up by son.

## 2024-06-05 NOTE — PROGRESS NOTES
Care Management Discharge Note    Discharge Date: 06/05/2024       Discharge Disposition: Assisted Living    Discharge Services: Home Care (clarified with [facility] - [facility] are in the building but pt is recommended for Home Care; [facility] states [facility] partner with [facility] for homecare cases; discharge orders to be faxed to [facility] (selected/accepted in EPIC))    Discharge DME: Wheelchair (has own from home here)    Discharge Transportation: family or friend will provide    Private pay costs discussed: Not applicable    Does the patient's insurance plan have a 3 day qualifying hospital stay waiver?  No    PAS Confirmation Code: NA  Patient/family educated on Medicare website which has current facility and service quality ratings: no    Education Provided on the Discharge Plan: Yes  Persons Notified of Discharge Plans:   Patient/Family in Agreement with the Plan:      Handoff Referral Completed: No    Additional Information:  Writer faxed over orders to [facility].    Radha Marks RN

## 2024-06-05 NOTE — PLAN OF CARE
Occupational Therapy Discharge Summary    Reason for therapy discharge:    Discharged to home with home therapy.    Progress towards therapy goal(s). See goals on Care Plan in [facility] electronic health record for goal details.  Goals partially met.  Barriers to achieving goals:   discharge from facility.    Therapy recommendation(s):    Continued therapy is recommended.  Rationale/Recommendations:  per chart review Noted son's decision to have pt return to [facility]; recommend pt have A with toileting, dressing and functional transfers as well as home OT to address home safety and to further progress strength and ADLs..

## 2024-06-07 LAB
INTERPRETATION: NORMAL
SIGNIFICANT RESULTS: NORMAL
SPECIMEN DESCRIPTION: NORMAL
TEST DETAILS, MDL: NORMAL

## 2024-06-29 ENCOUNTER — APPOINTMENT (OUTPATIENT)
Dept: ULTRASOUND IMAGING | Facility: CLINIC | Age: 89
End: 2024-06-29
Attending: EMERGENCY MEDICINE
Payer: MEDICARE

## 2024-06-29 ENCOUNTER — HOSPITAL ENCOUNTER (EMERGENCY)
Facility: CLINIC | Age: 89
Discharge: HOME | End: 2024-06-29
Attending: EMERGENCY MEDICINE | Admitting: EMERGENCY MEDICINE
Payer: MEDICARE

## 2024-06-29 ENCOUNTER — APPOINTMENT (OUTPATIENT)
Dept: GENERAL RADIOLOGY | Facility: CLINIC | Age: 89
End: 2024-06-29
Attending: BEHAVIOR TECHNICIAN
Payer: MEDICARE

## 2024-06-29 VITALS
SYSTOLIC BLOOD PRESSURE: 148 MMHG | HEIGHT: 65 IN | OXYGEN SATURATION: 98 % | DIASTOLIC BLOOD PRESSURE: 66 MMHG | RESPIRATION RATE: 16 BRPM | BODY MASS INDEX: 20.83 KG/M2 | HEART RATE: 59 BPM | WEIGHT: 125 LBS | TEMPERATURE: 97.4 F

## 2024-06-29 DIAGNOSIS — S80.01XA CONTUSION OF RIGHT KNEE: ICD-10-CM

## 2024-06-29 LAB
ANION GAP SERPL CALCULATED.3IONS-SCNC: 7 MMOL/L (ref 7–15)
BASOPHILS # BLD AUTO: 0.1 10E3/UL (ref 0–0.2)
BASOPHILS NFR BLD AUTO: 1 %
BUN SERPL-MCNC: 14 MG/DL (ref 8–23)
CALCIUM SERPL-MCNC: 9.4 MG/DL (ref 8.8–10.2)
CHLORIDE SERPL-SCNC: 102 MMOL/L (ref 98–107)
CREAT SERPL-MCNC: 0.64 MG/DL (ref 0.51–0.95)
DEPRECATED HCO3 PLAS-SCNC: 30 MMOL/L (ref 22–29)
EGFRCR SERPLBLD CKD-EPI 2021: 84 ML/MIN/1.73M2
EOSINOPHIL # BLD AUTO: 0.2 10E3/UL (ref 0–0.7)
EOSINOPHIL NFR BLD AUTO: 3 %
ERYTHROCYTE [DISTWIDTH] IN BLOOD BY AUTOMATED COUNT: 15.5 % (ref 10–15)
GLUCOSE SERPL-MCNC: 84 MG/DL (ref 70–99)
HCT VFR BLD AUTO: 28.5 % (ref 35–47)
HGB BLD-MCNC: 8.9 G/DL (ref 11.7–15.7)
HOLD SPECIMEN: NORMAL
IMM GRANULOCYTES # BLD: 0 10E3/UL
IMM GRANULOCYTES NFR BLD: 0 %
LYMPHOCYTES # BLD AUTO: 2 10E3/UL (ref 0.8–5.3)
LYMPHOCYTES NFR BLD AUTO: 26 %
MCH RBC QN AUTO: 29.3 PG (ref 26.5–33)
MCHC RBC AUTO-ENTMCNC: 31.2 G/DL (ref 31.5–36.5)
MCV RBC AUTO: 94 FL (ref 78–100)
MONOCYTES # BLD AUTO: 0.9 10E3/UL (ref 0–1.3)
MONOCYTES NFR BLD AUTO: 12 %
NEUTROPHILS # BLD AUTO: 4.4 10E3/UL (ref 1.6–8.3)
NEUTROPHILS NFR BLD AUTO: 58 %
NRBC # BLD AUTO: 0 10E3/UL
NRBC BLD AUTO-RTO: 0 /100
PLATELET # BLD AUTO: 558 10E3/UL (ref 150–450)
POTASSIUM SERPL-SCNC: 4.4 MMOL/L (ref 3.4–5.3)
RBC # BLD AUTO: 3.04 10E6/UL (ref 3.8–5.2)
SODIUM SERPL-SCNC: 139 MMOL/L (ref 135–145)
WBC # BLD AUTO: 7.6 10E3/UL (ref 4–11)

## 2024-06-29 PROCEDURE — 85025 COMPLETE CBC W/AUTO DIFF WBC: CPT | Performed by: EMERGENCY MEDICINE

## 2024-06-29 PROCEDURE — 80048 BASIC METABOLIC PNL TOTAL CA: CPT | Performed by: EMERGENCY MEDICINE

## 2024-06-29 PROCEDURE — 93971 EXTREMITY STUDY: CPT | Mod: RT

## 2024-06-29 PROCEDURE — 73562 X-RAY EXAM OF KNEE 3: CPT | Mod: RT

## 2024-06-29 PROCEDURE — 36415 COLL VENOUS BLD VENIPUNCTURE: CPT | Performed by: EMERGENCY MEDICINE

## 2024-06-29 PROCEDURE — 99284 EMERGENCY DEPT VISIT MOD MDM: CPT | Mod: 25

## 2024-06-29 ASSESSMENT — COLUMBIA-SUICIDE SEVERITY RATING SCALE - C-SSRS
6. HAVE YOU EVER DONE ANYTHING, STARTED TO DO ANYTHING, OR PREPARED TO DO ANYTHING TO END YOUR LIFE?: NO
2. HAVE YOU ACTUALLY HAD ANY THOUGHTS OF KILLING YOURSELF IN THE PAST MONTH?: NO
1. IN THE PAST MONTH, HAVE YOU WISHED YOU WERE DEAD OR WISHED YOU COULD GO TO SLEEP AND NOT WAKE UP?: NO

## 2024-06-29 ASSESSMENT — ACTIVITIES OF DAILY LIVING (ADL)
ADLS_ACUITY_SCORE: 38

## 2024-06-29 NOTE — ED PROVIDER NOTES
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  Patient states that she noticed bruising behind her right knee 2 days ago.  She states that the area behind the knee is painful.  She denies any injuries to the area or any falls.  She is chronically wheelchair-bound and transfers herself.  She denies any chest pain, shortness of breath, dizziness, lightheadedness.  She denies any other injuries or areas of bruising.     Independent Historian   Patient only.    Review of External Notes   Reviewed hospital admission note from 5/31/2024.  Admitted for persistent diarrhea, acute on chronic macrocytic anemia chronic thrombocytopenia.  History of paroxysmal atrial fibrillation and DVT.  On Xarelto.    Past Medical History     Medical History and Problem List   No past medical history on file.    Medications   acetaminophen (TYLENOL) 325 MG tablet  calcium citrate-vitamin D (CITRACAL) 200-6.25 MG-MCG TABS per tablet  dimethicone-zinc oxide (JUAN PROTECT) external cream  dimethicone-zinc oxide (JUAN PROTECT) external cream  diphenhydrAMINE-zinc acetate (BENADRYL) 2-0.1 % external cream  donepezil (ARICEPT) 5 MG tablet  ergocalciferol (ERGOCALCIFEROL) 1.25 MG (63010 UT) capsule  escitalopram (LEXAPRO) 10 MG tablet  ferrous sulfate (FE TABS) 325 (65 Fe) MG EC tablet  gabapentin (NEURONTIN) 100 MG capsule  lactobacillus rhamnosus, GG, (CULTURELL) capsule  Lidocaine (LIDOCARE) 4 % Patch  metoprolol tartrate (LOPRESSOR) 25 MG tablet  mineral oil-white petrolatum (EUCERIN/MINERIN) cream  mirabegron (MYRBETRIQ) 25 MG 24 hr tablet  polyethylene glycol (MIRALAX) 17 g packet  Prenatal Vit-Fe Fumarate-FA (PRENATAL VITAMIN) 27-0.8 MG TABS  rivaroxaban ANTICOAGULANT (XARELTO) 20 MG TABS tablet        Surgical History   No past surgical  "history on file.    Physical Exam     Patient Vitals for the past 24 hrs:   BP Temp Temp src Pulse Resp SpO2 Height Weight   06/29/24 1253 (!) 148/66 -- -- 59 -- 98 % -- --   06/29/24 1101 (!) 145/54 -- -- 54 -- 97 % -- --   06/29/24 0815 119/58 97.4  F (36.3  C) Oral 73 16 97 % 1.651 m (5' 5\") 56.7 kg (125 lb)     Physical Exam  Physical Exam:  General: lying comfortably on hospital bed  Head: normocephalic, atraumatic  Eyes: PERRLA, EOMI  Ears: External ears appear normal.   Nose: no signs of bleeding   Throat: moist mucous membranes  Neck: No JVD  CV: regular rate and rhythm  Pulm: lungs clear to ausculation bilaterally, normal respiratory effort, normal chest expansion with breathing   Abdomen: soft, non-tender, non-distended  MSK: No trauma  Ext: Large area of ecchymosis on the posterior right knee and distal hamstring.  Mild tenderness to palpation of the posterior right knee.  No erythema or area of fluctuance.  No palpable mass.  Normal range of motion of all extremities. No gross deformities.  Neurovascular is intact.  Skin: Warm, dry, no rashes  Neuro: alert and oriented  Psych: Appropriate mood. Cooperative      Diagnostics     Lab Results   Labs Ordered and Resulted from Time of ED Arrival to Time of ED Departure   BASIC METABOLIC PANEL - Abnormal       Result Value    Sodium 139      Potassium 4.4      Chloride 102      Carbon Dioxide (CO2) 30 (*)     Anion Gap 7      Urea Nitrogen 14.0      Creatinine 0.64      GFR Estimate 84      Calcium 9.4      Glucose 84     CBC WITH PLATELETS AND DIFFERENTIAL - Abnormal    WBC Count 7.6      RBC Count 3.04 (*)     Hemoglobin 8.9 (*)     Hematocrit 28.5 (*)     MCV 94      MCH 29.3      MCHC 31.2 (*)     RDW 15.5 (*)     Platelet Count 558 (*)     % Neutrophils 58      % Lymphocytes 26      % Monocytes 12      % Eosinophils 3      % Basophils 1      % Immature Granulocytes 0      NRBCs per 100 WBC 0      Absolute Neutrophils 4.4      Absolute Lymphocytes 2.0      " Absolute Monocytes 0.9      Absolute Eosinophils 0.2      Absolute Basophils 0.1      Absolute Immature Granulocytes 0.0      Absolute NRBCs 0.0         Imaging   US Lower Extremity Venous Duplex Right   Final Result   IMPRESSION:   1.  Negative right leg venous Doppler.      XR Knee Right 3 Views   Final Result   IMPRESSION: Chondrocalcinosis medial and lateral compartments. Degenerative narrowing of the medial compartment. No evidence for fracture or significant effusion. Vascular calcifications.          EKG   None    Independent Interpretation   X-ray of right knee shows no obvious fracture    ED Course      Medications Administered   Medications - No data to display    Procedures   Procedures     Discussion of Management   None    Social Determinants of Health adding to complexity of care   Transportation    ED Course   ED Course as of 06/29/24 2110   Sat Jun 29, 2024   0833 I evaluated patient and obtained history.   1058 Reassessed patient. Discussed discharge plans.       Medical Decision Making / Diagnosis     CMS Diagnoses: None    MIPS       None    MDM   Nallely Manrique is a 89 year old female who presents to the ED for evaluation of bruising.  Vitals are reassuring.  Patient states that she noticed bruising behind her right knee 2 days ago.  She states that this area is tender to touch.  She is chronically wheelchair-bound and transfers herself.  She denies any injuries or trauma to the area.  She denies any falls.  She has a history of DVT and is on a blood thinner.  She denies any dizziness, lightheadedness, chest pain, shortness of breath.  See further HPI details above.  Broad differential was considered including hematoma, DVT, abscess, cellulitis, contact dermatitis.  There is a large area of ecchymosis on the posterior right knee.  The bruising appears to be healing.  This area is tender.  No calf tenderness.  No erythema or fluctuance.  Neurovascular is intact.  X-ray does not show evidence  of fracture or dislocation.  Given history of DVTs, ultrasound was obtained.  No DVTs on ultrasound.  Labs are otherwise unremarkable.  Hemoglobin is at baseline.  She is hemodynamically stable.  Given that bruise appears to be resolving, she is hemodynamically stable, hemoglobin is at baseline, she is okay for outpatient follow-up.  Return precautions were given.    Disposition   The patient was discharged.     Diagnosis     ICD-10-CM    1. Contusion of right knee  S80.01XA            Discharge Medications   Discharge Medication List as of 6/29/2024 10:55 AM            YAN Bradley Kausar, PA-C  06/29/24 5708

## 2024-06-29 NOTE — DISCHARGE INSTRUCTIONS
You can take Tylenol or ibuprofen as needed for pain.  Please avoid further injury to this area.  Please return to the ER with increased pain, new trauma, ongoing bleeding.

## 2024-06-29 NOTE — ED NOTES
Pt gave RN verbal consent to share information with daughter; RN called and updated pt's daughter, Rajwinder, on plan to discharge.   [phone]

## 2024-06-29 NOTE — ED TRIAGE NOTES
EMS report: lives in independent living and has home RN who noticed large ecchymotic area to R posterior thigh. Patient denies any trauma or known injury. Patient states has been there for few days. RN send to ED for eval. Patient is on xarelto.    Baseline mobility is patient uses wheelchair and reports is independent with getting in/out     Triage Assessment (Adult)       Row Name 06/29/24 0816          Triage Assessment    Airway WDL WDL        Respiratory WDL    Respiratory WDL WDL        Skin Circulation/Temperature WDL    Skin Circulation/Temperature WDL X  large healing-appearing ecchymotic area to R posterior thigh.        Cardiac WDL    Cardiac WDL WDL        Peripheral/Neurovascular WDL    Peripheral Neurovascular WDL WDL        Cognitive/Neuro/Behavioral WDL    Cognitive/Neuro/Behavioral WDL WDL

## 2024-06-29 NOTE — ED NOTES
Bed: ED08  Expected date:   Expected time:   Means of arrival:   [place]  [facility] [phone]

## 2024-06-29 NOTE — ED PROVIDER NOTES
"  Emergency Department Note      History of Present Illness     Chief Complaint   Bleeding/Bruising      HPI   Nallely Manrique is a 89 year old female on Xarelto with a history of hypertension who presents to the ED with bleeding and bruising on her right posterior thigh. She says it is sore and hurts. Does not recall any injuries. She is wheelchair bound and able to transfer herself from wheelchair to bed as normal.  Reportedly a visiting nurse came to check on her and found ecchymosis and referred her to the ED.  No numbness or weakness of the foot.  No hip or back pain.    Independent Historian   EMS to contribute to the history that her home nurse noted the area of ecchymosis and associated with calling EMS.    Review of External Notes   H&P reviewed from May 31, 2024.  Was noted at that time that the patient did have a DVT status post a hip replacement and also has paroxysmal A-fib and is on Xarelto.    Past Medical History     Medical History and Problem List   Basal cell carcinoma  Chronic obstructive pulmonary disease   Hypertension   GERD   Hyperlipidemia   Paroxysmal atrial fibrillation  Glaucoma  Polymyalgia rheumatica     Medications   donepezil (ARICEPT) 5 MG tablet  ergocalciferol (ERGOCALCIFEROL) 1.25 MG (97650 UT) capsule  escitalopram (LEXAPRO) 10 MG tablet  gabapentin (NEURONTIN) 100 MG capsule  lactobacillus rhamnosus, GG, (CULTURELL) capsule  metoprolol tartrate (LOPRESSOR) 25 MG tablet  mirabegron (MYRBETRIQ) 25 MG 24 hr tablet  rivaroxaban ANTICOAGULANT (XARELTO) 20 MG TABS tablet    Physical Exam     Patient Vitals for the past 24 hrs:   BP Temp Temp src Pulse Resp SpO2 Height Weight   06/29/24 1253 (!) 148/66 -- -- 59 -- 98 % -- --   06/29/24 1101 (!) 145/54 -- -- 54 -- 97 % -- --   06/29/24 0815 119/58 97.4  F (36.3  C) Oral 73 16 97 % 1.651 m (5' 5\") 56.7 kg (125 lb)     Physical Exam  Constitutional:       General: She is not in acute distress.     Appearance: Normal appearance. She is " not diaphoretic.   HENT:      Head: Atraumatic.      Mouth/Throat:      Mouth: Mucous membranes are moist.   Eyes:      General: No scleral icterus.     Conjunctiva/sclera: Conjunctivae normal.   Cardiovascular:      Rate and Rhythm: Normal rate and regular rhythm.      Heart sounds: Normal heart sounds.   Pulmonary:      Effort: No respiratory distress.      Breath sounds: Normal breath sounds.   Abdominal:      General: Abdomen is flat. There is no distension.      Tenderness: There is no abdominal tenderness.   Musculoskeletal:      Cervical back: Neck supple.      Comments: There is ecchymosis over the distal right hamstring, popliteal fossa, and proximal calf.  Minimal tenderness.  Ecchymosis appears to be at least 2 or 3 days old.  No fluctuance or induration.  There is no palpable hematoma or mass.  Full range of motion of the knee.  No tenderness to the anterior knee.  Full range of motion of the right ankle and hip.   Skin:     General: Skin is warm.      Findings: No rash.   Neurological:      General: No focal deficit present.      Mental Status: She is alert and oriented to person, place, and time.      Comments: Strength and sensation of the right foot intact.   Psychiatric:         Mood and Affect: Mood normal.         Behavior: Behavior normal.           Diagnostics     Lab Results   Labs Ordered and Resulted from Time of ED Arrival to Time of ED Departure   BASIC METABOLIC PANEL - Abnormal       Result Value    Sodium 139      Potassium 4.4      Chloride 102      Carbon Dioxide (CO2) 30 (*)     Anion Gap 7      Urea Nitrogen 14.0      Creatinine 0.64      GFR Estimate 84      Calcium 9.4      Glucose 84     CBC WITH PLATELETS AND DIFFERENTIAL - Abnormal    WBC Count 7.6      RBC Count 3.04 (*)     Hemoglobin 8.9 (*)     Hematocrit 28.5 (*)     MCV 94      MCH 29.3      MCHC 31.2 (*)     RDW 15.5 (*)     Platelet Count 558 (*)     % Neutrophils 58      % Lymphocytes 26      % Monocytes 12      %  Eosinophils 3      % Basophils 1      % Immature Granulocytes 0      NRBCs per 100 WBC 0      Absolute Neutrophils 4.4      Absolute Lymphocytes 2.0      Absolute Monocytes 0.9      Absolute Eosinophils 0.2      Absolute Basophils 0.1      Absolute Immature Granulocytes 0.0      Absolute NRBCs 0.0         Imaging   US Lower Extremity Venous Duplex Right   Final Result   IMPRESSION:   1.  Negative right leg venous Doppler.      XR Knee Right 3 Views   Final Result   IMPRESSION: Chondrocalcinosis medial and lateral compartments. Degenerative narrowing of the medial compartment. No evidence for fracture or significant effusion. Vascular calcifications.        Independent Interpretation   X-ray of the right knee independently interpreted.  No fracture.    ED Course      ED Course   ED Course as of 06/29/24 1321   Sat Jun 29, 2024   0833 I evaluated patient and obtained history.   1058 Reassessed patient. Discussed discharge plans.     Medical Decision Making / Diagnosis     HUMERA Manrique is a 89 year old female who presents to the ED for ecchymosis of the posterior right knee.  X-ray without fracture.  Hemoglobin at baseline.  DVT study negative.  She is on Xarelto but the ecchymosis appears old and resolving.  She is appropriate for ongoing outpatient follow-up.    Disposition   The patient was discharged.     Diagnosis     ICD-10-CM    1. Contusion of right knee  S80.01XA            Scribe Disclosure:  Kev BLAIR, am serving as a scribe at 8:42 AM on 6/29/2024 to document services personally performed by Lázaro Brunner MD based on my observations and the provider's statements to me.        Lázaro Brunner MD  06/29/24 1321

## 2024-07-29 NOTE — TELEPHONE ENCOUNTER
RECORDS STATUS - ALL OTHER DIAGNOSIS      RECORDS RECEIVED FROM: [facility] - Internal records   DATE RECEIVED: 7/29

## 2024-08-05 ENCOUNTER — VIRTUAL VISIT (OUTPATIENT)
Dept: ONCOLOGY | Facility: HOSPITAL | Age: 89
End: 2024-08-05
Attending: HOSPITALIST
Payer: MEDICARE

## 2024-08-05 ENCOUNTER — PRE VISIT (OUTPATIENT)
Dept: ONCOLOGY | Facility: HOSPITAL | Age: 89
End: 2024-08-05
Payer: MEDICARE

## 2024-08-05 VITALS — HEIGHT: 67 IN | WEIGHT: 115 LBS | BODY MASS INDEX: 18.05 KG/M2

## 2024-08-05 DIAGNOSIS — D64.9 NORMOCYTIC ANEMIA: Primary | ICD-10-CM

## 2024-08-05 DIAGNOSIS — D64.9 ANEMIA, UNSPECIFIED TYPE: ICD-10-CM

## 2024-08-05 PROCEDURE — 99204 OFFICE O/P NEW MOD 45 MIN: CPT | Mod: 95 | Performed by: INTERNAL MEDICINE

## 2024-08-05 NOTE — PROGRESS NOTES
Virtual Visit Details    Type of service:  Video Visit   Video Start Time:  14:00  Video End Time:  14:15    Originating Location (pt. Location): Assisted Living    Distant Location (provider location):  On-site  Platform used for Video Visit: [employer] Hematology and Oncology Consult Note      Patient: Nallely Manrique  MRN: [id number]  Date of Service: Aug 5, 2024      We have been asked by Dr. Cartwright to evaluate Nallely Manrique for anemia and thrombocytosis.    Assessment/Plan:    1.  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  Her molecular evaluation for myeloma proliferative neoplasm is negative.  No evidence of nutritional deficiency.  Her haptoglobin was low in June which can be seen with chronic hemolysis and MDS.  No RBC fragments on peripheral smear. The likely diagnosis was reviewed with the patient and her son.  She has no evidence of renal dysfunction.  Reviewed that we would need a bone marrow biopsy to confirm the diagnosis.  If MDS was found we could treat with Retacrit.  The patient is really not interested in doing a marrow or treating this condition medically.  Her  had MDS and passed away from it which is influencing her decision.  Were going to do is check her labs every month for a few months to get an idea of how her hemoglobin will trend.  I would recommend transfusion if her hemoglobin is less than 8.  Otherwise has no plan for intervention.  We can see her back in clinic virtually in 4 months or so.  Questions were answered.    ECOG Performance  3    History:    Nallely is an 89-year-old woman who is referred today anemia.  She also has thrombocytosis.  She was hospitalized in June during which she was found to have a hemoglobin of 7.1.  She does have a baseline anemia.  She was seen by hematology and GI.  She has significant serologic evaluation which was negative for any etiology of her anemia.  Anemia of  chronic disease was thought to be playing a role.  She was started on B12 and folate.  CRP was elevated up to 40.  She now presents for follow-up.  Generally feeling okay today.  She is with her son at her long-term care facility.    Past History:    No past medical history on file.  Hypertension  Chronic anemia   No family history on file.  No known family history of any blood dyscrasias   [unfilled] Social History     Socioeconomic History    Marital status: Single     Spouse name: Not on file    Number of children: Not on file    Years of education: Not on file    Highest education level: Not on file   Occupational History    Not on file   Tobacco Use    Smoking status: Not on file    Smokeless tobacco: Not on file   Substance and Sexual Activity    Alcohol use: Not on file    Drug use: Not on file    Sexual activity: Not on file   Other Topics Concern    Not on file   Social History Narrative    Not on file     Social Determinants of Health     Financial Resource Strain: Not on file   Food Insecurity: Not on file   Transportation Needs: Not on file   Physical Activity: Not on file   Stress: Not on file   Social Connections: Not on file   Interpersonal Safety: Low Risk  (12/21/2023)    Interpersonal Safety     Do you feel physically and emotionally safe where you currently live?: Yes     Within the past 12 months, have you been hit, slapped, kicked or otherwise physically hurt by someone?: No     Within the past 12 months, have you been humiliated or emotionally abused in other ways by your partner or ex-partner?: No   Housing Stability: Not on file        Allergies:    No Known Allergies    Review of Systems:    As above in the history.     Review of Systems otherwise Negative for:  General: chills, fever or night sweats  Psychological: anxiety or depression  Ophthalmic: blurry vision, double vision or loss of vision, vision change  ENT: epistaxis, oral lesions, hearing changes  Hematological and Lymphatic:  "bleeding, bruising, jaundice, swollen lymph nodes  Endocrine: hot flashes, unexpected weight changes  Respiratory: cough, hemoptysis, orthopnea or shortness of breath/HARTMAN  Cardiovascular: chest pain, edema, palpitations or PND  Gastrointestinal: abdominal pain, blood in stools, change in bowel habits, constipation, diarrhea or nausea/vomiting  Genito-Urinary: change in urinary stream, incontinence, frequency/urgency  Musculoskeletal: joint pain, stiffness, swelling, muscle pain  Neurological: dizziness, headaches, numbness/tingling  Dermatological: lumps and rash    Physical Exam:    Ht 1.702 m (5' 7\")   Wt 52.2 kg (115 lb)   BMI 18.01 kg/m      General: patient appears stated age of 89 year old. Nontoxic and in no distress.   HEENT: Head: atraumatic, normocephalic. Sclerae anicteric.  Chest:  Normal respiratory effort  Cardiac:  No edema.   Abdomen: abdomen is non-distended  Extremities: normal tone and muscle bulk.   Skin: no lesions or rash on visible skin. Warm and dry.   CNS: alert and oriented. Grossly non-focal.   Psychiatric: normal mood and affect.     Lab Results:    No results found for this or any previous visit (from the past 168 hour(s)).    Imaging Results:    No results found.      Signed by: Suresh Witt MD  "

## 2024-08-05 NOTE — NURSING NOTE
Patient confirms medications and allergies are accurate via patients echeck in completion, and or denies any changes since last reviewed/verified.     Current patient location: [address] [address]    Is the patient currently in the state of [place]? YES    Visit mode:VIDEO    If the visit is dropped, the patient can be reconnected by: VIDEO VISIT: Text to cell phone:   Telephone Information:   Mobile [phone]       Will anyone else be joining the visit? Cody Son  (If patient encounters technical issues they should call [phone] :[phone])    How would you like to obtain your AVS? MyChart    Are changes needed to the allergy or medication list? No    Are refills needed on medications prescribed by this physician? NO    Rooming Documentation:  Assigned questionnaire(s) completed      Reason for visit: Consult    Elizabeth Ye VVF

## 2024-08-05 NOTE — PROGRESS NOTES
"Virtual Visit Details    Type of service:  Video Visit   Video Start Time: {video visit start/end time for provider to select:194177}  Video End Time:{video visit start/end time for provider to select:389287}    Originating Location (pt. Location): {video visit patient location:418713::\"Home\"}  {PROVIDER LOCATION On-site should be selected for visits conducted from your clinic location or adjoining [facility] hospital, academic office, or other nearby [facility] building. Off-site should be selected for all other provider locations, including home:[id number]}  Distant Location (provider location):  {virtual location provider:758524}  Platform used for Video Visit: {Virtual Visit Platforms:968791::\"[employer]\"}  "

## 2024-08-06 ENCOUNTER — PATIENT OUTREACH (OUTPATIENT)
Dept: ONCOLOGY | Facility: HOSPITAL | Age: 89
End: 2024-08-06
Payer: MEDICARE

## 2024-08-06 NOTE — PROGRESS NOTES
[facility]: Cancer Care                                                                                            Situation: Patient chart reviewed by care coordinator.      Plan/Recommendations: Patient saw Dr. Witt on 8/5 as a new heme consult.  Patient is to check her CBC every month at her facility.  [facility] is to facilitate that.  Patient is to return to clinic for MD visit in 4 months and have that visit be a virtual visit.    Signature:  Ernestine Mitchell RN

## 2024-08-26 ENCOUNTER — PATIENT OUTREACH (OUTPATIENT)
Dept: ONCOLOGY | Facility: HOSPITAL | Age: 89
End: 2024-08-26
Payer: MEDICARE

## 2024-08-26 NOTE — PROGRESS NOTES
[facility]: Cancer Care                                                                                          Called and left a detailed voice message on the charge nurse's voicemail at [facility] regarding setting up monthly CBC labs for the patient. Gave a call back number.     Signature:  Ernestine Mitchell RN

## 2024-08-27 ENCOUNTER — PATIENT OUTREACH (OUTPATIENT)
Dept: ONCOLOGY | Facility: HOSPITAL | Age: 89
End: 2024-08-27
Payer: MEDICARE

## 2024-08-27 ENCOUNTER — TELEPHONE (OUTPATIENT)
Dept: ONCOLOGY | Facility: HOSPITAL | Age: 89
End: 2024-08-27
Payer: MEDICARE

## 2024-08-27 DIAGNOSIS — D64.9 NORMOCYTIC ANEMIA: Primary | ICD-10-CM

## 2024-08-27 DIAGNOSIS — D64.9 ANEMIA, UNSPECIFIED TYPE: ICD-10-CM

## 2024-08-27 NOTE — TELEPHONE ENCOUNTER
I received a phone call today from Magda from [facility].  She is calling because she received an order for a CBC with platelet today.  She is wondering if Dr. Witt wants to have her hemoglobin checked as well.  I let her know that a hemoglobin is part of a complete blood count panel.  She has no other questions at this time.    Eva Mckinney RN on 8/27/2024 at 1:18 PM

## 2024-08-27 NOTE — PROGRESS NOTES
[facility]: Cancer Care                                                                                          Received a call from Magda, charge nurse at [facility] confirming that they can draw monthly CBC labs on the patient till end of October per Dr. Witt' request. I faxed the CBC with platelet standing order to [facility], ATTN: Nursing at [phone] via [facility].     Signature:  Ernestine Mitchell RN

## 2025-01-16 ENCOUNTER — HOSPITAL ENCOUNTER (EMERGENCY)
Facility: CLINIC | Age: OVER 89
Discharge: HOME | End: 2025-01-16
Attending: EMERGENCY MEDICINE
Payer: MEDICARE

## 2025-01-16 ENCOUNTER — APPOINTMENT (OUTPATIENT)
Dept: CT IMAGING | Facility: CLINIC | Age: OVER 89
End: 2025-01-16
Payer: MEDICARE

## 2025-01-16 ENCOUNTER — TRANSFERRED RECORDS (OUTPATIENT)
Dept: HEALTH INFORMATION MANAGEMENT | Facility: CLINIC | Age: OVER 89
End: 2025-01-16

## 2025-01-16 ENCOUNTER — APPOINTMENT (OUTPATIENT)
Dept: GENERAL RADIOLOGY | Facility: CLINIC | Age: OVER 89
End: 2025-01-16
Payer: MEDICARE

## 2025-01-16 VITALS
TEMPERATURE: 97.8 F | DIASTOLIC BLOOD PRESSURE: 63 MMHG | RESPIRATION RATE: 12 BRPM | HEART RATE: 94 BPM | OXYGEN SATURATION: 98 % | SYSTOLIC BLOOD PRESSURE: 132 MMHG

## 2025-01-16 DIAGNOSIS — R53.1 WEAKNESS GENERALIZED: ICD-10-CM
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
## 2025-01-16 DIAGNOSIS — R05.9 COUGH: ICD-10-CM
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
## 2025-01-16 LAB
ALBUMIN SERPL BCG-MCNC: 3.6 G/DL (ref 3.5–5.2)
ALBUMIN UR-MCNC: 50 MG/DL
ALP SERPL-CCNC: 93 U/L (ref 40–150)
ALT SERPL W P-5'-P-CCNC: 18 U/L (ref 0–50)
ANION GAP SERPL CALCULATED.3IONS-SCNC: 12 MMOL/L (ref 7–15)
APPEARANCE UR: CLEAR
AST SERPL W P-5'-P-CCNC: 47 U/L (ref 0–45)
ATRIAL RATE - MUSE: 81 BPM
BASOPHILS # BLD AUTO: 0 10E3/UL (ref 0–0.2)
BASOPHILS NFR BLD AUTO: 0 %
BILIRUB SERPL-MCNC: 0.5 MG/DL
BILIRUB UR QL STRIP: NEGATIVE
BUN SERPL-MCNC: 16.8 MG/DL (ref 8–23)
CALCIUM SERPL-MCNC: 9 MG/DL (ref 8.8–10.4)
CHLORIDE SERPL-SCNC: 98 MMOL/L (ref 98–107)
COLOR UR AUTO: YELLOW
CREAT SERPL-MCNC: 0.72 MG/DL (ref 0.51–0.95)
DIASTOLIC BLOOD PRESSURE - MUSE: NORMAL MMHG
EGFRCR SERPLBLD CKD-EPI 2021: 79 ML/MIN/1.73M2
EOSINOPHIL # BLD AUTO: 0 10E3/UL (ref 0–0.7)
EOSINOPHIL NFR BLD AUTO: 0 %
ERYTHROCYTE [DISTWIDTH] IN BLOOD BY AUTOMATED COUNT: 14.7 % (ref 10–15)
FLUAV RNA SPEC QL NAA+PROBE: NEGATIVE
FLUBV RNA RESP QL NAA+PROBE: NEGATIVE
GLUCOSE SERPL-MCNC: 120 MG/DL (ref 70–99)
GLUCOSE UR STRIP-MCNC: NEGATIVE MG/DL
HCO3 SERPL-SCNC: 25 MMOL/L (ref 22–29)
HCT VFR BLD AUTO: 33.4 % (ref 35–47)
HGB BLD-MCNC: 11.2 G/DL (ref 11.7–15.7)
HGB UR QL STRIP: ABNORMAL
HOLD SPECIMEN: NORMAL
HOLD SPECIMEN: NORMAL
IMM GRANULOCYTES # BLD: 0.2 10E3/UL
IMM GRANULOCYTES NFR BLD: 1 %
INTERPRETATION ECG - MUSE: NORMAL
KETONES UR STRIP-MCNC: 40 MG/DL
LACTATE SERPL-SCNC: 1.4 MMOL/L (ref 0.7–2)
LEUKOCYTE ESTERASE UR QL STRIP: NEGATIVE
LYMPHOCYTES # BLD AUTO: 1.5 10E3/UL (ref 0.8–5.3)
LYMPHOCYTES NFR BLD AUTO: 7 %
MCH RBC QN AUTO: 30.9 PG (ref 26.5–33)
MCHC RBC AUTO-ENTMCNC: 33.5 G/DL (ref 31.5–36.5)
MCV RBC AUTO: 92 FL (ref 78–100)
MONOCYTES # BLD AUTO: 1.3 10E3/UL (ref 0–1.3)
MONOCYTES NFR BLD AUTO: 6 %
MUCOUS THREADS #/AREA URNS LPF: PRESENT /LPF
NEUTROPHILS # BLD AUTO: 20.5 10E3/UL (ref 1.6–8.3)
NEUTROPHILS NFR BLD AUTO: 87 %
NITRATE UR QL: NEGATIVE
NRBC # BLD AUTO: 0 10E3/UL
NRBC BLD AUTO-RTO: 0 /100
P AXIS - MUSE: 54 DEGREES
PH UR STRIP: 6 [PH] (ref 5–7)
PLATELET # BLD AUTO: 337 10E3/UL (ref 150–450)
POTASSIUM SERPL-SCNC: 4 MMOL/L (ref 3.4–5.3)
PR INTERVAL - MUSE: 160 MS
PROT SERPL-MCNC: 6.8 G/DL (ref 6.4–8.3)
QRS DURATION - MUSE: 126 MS
QT - MUSE: 408 MS
QTC - MUSE: 473 MS
R AXIS - MUSE: -77 DEGREES
RBC # BLD AUTO: 3.62 10E6/UL (ref 3.8–5.2)
RBC URINE: 3 /HPF
RSV RNA SPEC NAA+PROBE: NEGATIVE
SARS-COV-2 RNA RESP QL NAA+PROBE: NEGATIVE
SODIUM SERPL-SCNC: 135 MMOL/L (ref 135–145)
SP GR UR STRIP: 1.02 (ref 1–1.03)
SQUAMOUS EPITHELIAL: <1 /HPF
SYSTOLIC BLOOD PRESSURE - MUSE: NORMAL MMHG
T AXIS - MUSE: 40 DEGREES
UROBILINOGEN UR STRIP-MCNC: NORMAL MG/DL
VENTRICULAR RATE- MUSE: 81 BPM
WBC # BLD AUTO: 23.6 10E3/UL (ref 4–11)
WBC URINE: 1 /HPF

## 2025-01-16 PROCEDURE — 81001 URINALYSIS AUTO W/SCOPE: CPT | Performed by: EMERGENCY MEDICINE

## 2025-01-16 PROCEDURE — 87637 SARSCOV2&INF A&B&RSV AMP PRB: CPT | Performed by: EMERGENCY MEDICINE

## 2025-01-16 PROCEDURE — 87040 BLOOD CULTURE FOR BACTERIA: CPT

## 2025-01-16 PROCEDURE — 36415 COLL VENOUS BLD VENIPUNCTURE: CPT | Performed by: EMERGENCY MEDICINE

## 2025-01-16 PROCEDURE — 70450 CT HEAD/BRAIN W/O DYE: CPT

## 2025-01-16 PROCEDURE — 80053 COMPREHEN METABOLIC PANEL: CPT | Performed by: EMERGENCY MEDICINE

## 2025-01-16 PROCEDURE — 82040 ASSAY OF SERUM ALBUMIN: CPT | Performed by: EMERGENCY MEDICINE

## 2025-01-16 PROCEDURE — 85025 COMPLETE CBC W/AUTO DIFF WBC: CPT | Performed by: EMERGENCY MEDICINE

## 2025-01-16 PROCEDURE — 71046 X-RAY EXAM CHEST 2 VIEWS: CPT

## 2025-01-16 PROCEDURE — 83605 ASSAY OF LACTIC ACID: CPT | Performed by: EMERGENCY MEDICINE

## 2025-01-16 PROCEDURE — 93005 ELECTROCARDIOGRAM TRACING: CPT

## 2025-01-16 PROCEDURE — 72125 CT NECK SPINE W/O DYE: CPT

## 2025-01-16 PROCEDURE — 99285 EMERGENCY DEPT VISIT HI MDM: CPT | Mod: 25

## 2025-01-16 ASSESSMENT — ACTIVITIES OF DAILY LIVING (ADL)
ADLS_ACUITY_SCORE: 57

## 2025-01-16 ASSESSMENT — COLUMBIA-SUICIDE SEVERITY RATING SCALE - C-SSRS
2. HAVE YOU ACTUALLY HAD ANY THOUGHTS OF KILLING YOURSELF IN THE PAST MONTH?: NO
1. IN THE PAST MONTH, HAVE YOU WISHED YOU WERE DEAD OR WISHED YOU COULD GO TO SLEEP AND NOT WAKE UP?: NO
6. HAVE YOU EVER DONE ANYTHING, STARTED TO DO ANYTHING, OR PREPARED TO DO ANYTHING TO END YOUR LIFE?: NO

## 2025-01-16 NOTE — ED NOTES
ED APC SUPERVISION NOTE:   I evaluated this patient in conjunction with Phyllis Peters PA-C  I have participated in the care of the patient and personally performed key elements of the history, exam, and medical decision making.      HPI:   Nallely Manrique is a 89 year old female who is anticoagulated on Xarelto for paroxysmal A-fib who presents after general weakness and falls.  Per EMS she has had a couple of unwitnessed falls at her facility.  Apparently she is also not been eating as much as usual.  The patient's son was contacted and states that he has been sick over the past couple of days and some left eye members have respiratory infections    Independent Historian:   Son as detailed above.    Review of External Notes: ED visit on June 29, 2024 for contusion of the right knee     EXAM:   /66   Pulse 91   Temp 97.8  F (36.6  C) (Oral)   Resp 13   SpO2 97%    Constitutional: Vital signs reviewed as above  General: Alert  HEENT: Moist mucous membranes  Eyes: Conjunctiva normal.   Neck: Normal range of motion.  No midline neck tenderness.  Cardiovascular: Regular rate, Regular rhythm and normal heart sounds.  No MRG  Pulmonary/Chest: Effort normal and breath sounds normal. No respiratory distress. Patient has no wheezes. Patient has no rales.   Abdominal: Soft. Positive bowel sounds. No MRG.  Musculoskeletal/Extremities: Full ROM.  Endo: No pitting edema  Neurological: Alert, no focal deficits.  GCS 15.  Skin: Skin is warm and dry.   Psychiatric: Alert    Independent Interpretation (X-rays, CTs, rhythm strip):  X-ray chest shows no signs of infiltrate  CT head shows no bleeding or midline shift    Consultations/Discussion of Management or Tests:  None     MEDICAL DECISION MAKING/ASSESSMENT AND PLAN:   Patient presents as detailed above.  Get some collateral information from her son who states that she has been sick for the past couple of days as have many members of her family.  As her  falls, her head and C-spine's were negative.  Chest x-ray was also unremarkable.  Her comprehensive metabolic panel was essentially unremarkable.  Lactate was normal.  UA was negative for signs of infection.  He does have a leukocytosis at 23.6.  Again, no signs of pneumonia, her viral panel is negative.  No signs of UTI.  Blood cultures have been ordered.  She has been afebrile the entire time here.  Her abdominal exam is benign as such imaging was not performed.  We discussed with the son that we feel she is safe to discharge home.  The white count could be partly because of the viral URI she has been having as well as the falls and the stress from that.  Certainly if the blood cultures come back positive I will let them know.  Obviously if things deteriorate at her facility they can return for further evaluation.    DIAGNOSIS:     ICD-10-CM    1. Weakness generalized  R53.1       2. Fall at home, initial encounter  W19.XXXA     Y92.009       3. Cough  R05.9             Aj Davis MD  1/16/2025  [facility] EMERGENCY DEPT     Aj Davis MD  01/16/25 1814

## 2025-01-16 NOTE — ED TRIAGE NOTES
Pt has had 2 unwitnessed falls in the last 24 hrs; she lives in assisted living and staff reports pt has been increasing confused; pt states she didn't hit her head but states she has been feeling weak and confused;  Son agreed with staff to have her sent to hospital

## 2025-01-16 NOTE — ED NOTES
Bed: ED21  Expected date:   Expected time:   Means of arrival:   Comments:  449  89 F weakness/lethargy/multi falls on thinners

## 2025-01-16 NOTE — DISCHARGE INSTRUCTIONS

## 2025-01-16 NOTE — ED PROVIDER NOTES
Emergency Department Note      History of Present Illness     Chief Complaint   Fall and Generalized Weakness      HPI   Nallely Manrique is a 89 year old female with a past medical history of paroxysmal atrial fibrillation on Xarelto, anemia, PMR, cognitive impairment who presents to the emergency department for evaluation of generalized weakness and falls.  History is limited due to patient's cognitive impairment and altered mental status.  Per EMS report, the patient had 2 unwitnessed falls at her nursing facility.  They are unsure of head strike.  Nursing home additionally notes decreased p.o. intake.  On my interview with the patient she denies any pain.  Notes that she has had a cough.  Denies chest pain, shortness of breath, headache, vision changes, abdominal pain, dysuria, urinary frequency.    I talked with the patient's son, he states that she has been sick over the past 2 days.  He was in contact with her and he is additionally sick with an upper respiratory infection.  Does note that the patient is wheelchair-bound at baseline with pivot transfer.  States that she typically does get confused when she is sick.    Independent Historian   Son as detailed above.    Review of External Notes   Discharge summary 5/31/2024:   Mild acute encephalopathy  Cognitive impairment   *Son reports cognitive decline after surgery in July '23, but gradually improved afterward until about a week ago when he noticed worsening memory deficits.  - CT head on 5/31/2024 showed no acute changes, did show brain atrophy and advanced presumed sequelae of chronic microvascular ischemic disease.    - Continue PTA Aricept.  - OT consulted for cognitive evaluation, she scored 19/30 on SLUMS testing.  - Appears that this a significant change in her cognition over the past 7-10 days. No other obvious focal neurologic deficits. B12 and folate within normal limits. TSH within normal limits. No obvious infectious symptoms noted, but UA  on 6/3/24 suggestive of UTI as noted below.  - Neurology consulted, appreciate their assistance.  - Follow-up in neurology clinic in 1 to 2 months.    Past Medical History     Medical History and Problem List   No past medical history on file.    Medications   acetaminophen (TYLENOL) 325 MG tablet  calcium citrate-vitamin D (CITRACAL) 200-6.25 MG-MCG TABS per tablet  dimethicone-zinc oxide (JUAN PROTECT) external cream  dimethicone-zinc oxide (JUAN PROTECT) external cream  diphenhydrAMINE-zinc acetate (BENADRYL) 2-0.1 % external cream  donepezil (ARICEPT) 5 MG tablet  ergocalciferol (ERGOCALCIFEROL) 1.25 MG (23337 UT) capsule  escitalopram (LEXAPRO) 10 MG tablet  ferrous sulfate (FE TABS) 325 (65 Fe) MG EC tablet  gabapentin (NEURONTIN) 100 MG capsule  lactobacillus rhamnosus, GG, (CULTURELL) capsule  Lidocaine (LIDOCARE) 4 % Patch  metoprolol tartrate (LOPRESSOR) 25 MG tablet  mineral oil-white petrolatum (EUCERIN/MINERIN) cream  mirabegron (MYRBETRIQ) 25 MG 24 hr tablet  polyethylene glycol (MIRALAX) 17 g packet  Prenatal Vit-Fe Fumarate-FA (PRENATAL VITAMIN) 27-0.8 MG TABS  rivaroxaban ANTICOAGULANT (XARELTO) 20 MG TABS tablet        Surgical History   No past surgical history on file.    Physical Exam     Patient Vitals for the past 24 hrs:   BP Temp Temp src Pulse Resp SpO2   01/16/25 1500 120/66 -- -- 91 13 97 %   01/16/25 1430 129/67 -- -- 87 16 97 %   01/16/25 1400 130/58 -- -- 80 24 96 %   01/16/25 1330 117/55 -- -- 112 11 96 %   01/16/25 1300 129/63 -- -- 85 20 97 %   01/16/25 1230 124/68 -- -- 81 12 96 %   01/16/25 1214 127/64 -- -- 80 17 96 %   01/16/25 1211 130/68 97.8  F (36.6  C) Oral 86 14 96 %     Physical Exam  General: Awake, alert, non-toxic.  Head:  Scalp is atraumatic.   Eyes:  Conjunctiva normal, PERRL  ENT:  The external nose and ears are normal.     Oropharynx clear, uvula midline.  Neck:  Normal range of motion without rigidity.  CV:  Regular rate and rhythm    No pathologic murmur, rubs,  or gallops.  Resp:  Breath sounds are clear bilaterally.  Occasional dry cough.    Non-labored, no retractions or accessory muscle use  Abdomen: Abdomen is soft, no distension, no tenderness, no masses. No CVA tenderness.  MS:  No lower extremity edema/swelling. No midline cervical, thoracic, or lumbar tenderness.  Extremities without joint swelling or redness.  Skin:  Warm and dry, No rash or lesions noted.  Neuro:  Alert.  Oriented to self and place. GCS 15. Moves all extremities normal.  No facial asymmetry.   Psych: Awake. Alert. Normal affect. Appropriate interactions.    Diagnostics     Lab Results   Labs Ordered and Resulted from Time of ED Arrival to Time of ED Departure   ROUTINE UA WITH MICROSCOPIC REFLEX TO CULTURE - Abnormal       Result Value    Color Urine Yellow      Appearance Urine Clear      Glucose Urine Negative      Bilirubin Urine Negative      Ketones Urine 40 (*)     Specific Gravity Urine 1.023      Blood Urine Small (*)     pH Urine 6.0      Protein Albumin Urine 50 (*)     Urobilinogen Urine Normal      Nitrite Urine Negative      Leukocyte Esterase Urine Negative      Mucus Urine Present (*)     RBC Urine 3 (*)     WBC Urine 1      Squamous Epithelials Urine <1     COMPREHENSIVE METABOLIC PANEL - Abnormal    Sodium 135      Potassium 4.0      Carbon Dioxide (CO2) 25      Anion Gap 12      Urea Nitrogen 16.8      Creatinine 0.72      GFR Estimate 79      Calcium 9.0      Chloride 98      Glucose 120 (*)     Alkaline Phosphatase 93      AST 47 (*)     ALT 18      Protein Total 6.8      Albumin 3.6      Bilirubin Total 0.5     CBC WITH PLATELETS AND DIFFERENTIAL - Abnormal    WBC Count 23.6 (*)     RBC Count 3.62 (*)     Hemoglobin 11.2 (*)     Hematocrit 33.4 (*)     MCV 92      MCH 30.9      MCHC 33.5      RDW 14.7      Platelet Count 337      % Neutrophils 87      % Lymphocytes 7      % Monocytes 6      % Eosinophils 0      % Basophils 0      % Immature Granulocytes 1      NRBCs per 100  WBC 0      Absolute Neutrophils 20.5 (*)     Absolute Lymphocytes 1.5      Absolute Monocytes 1.3      Absolute Eosinophils 0.0      Absolute Basophils 0.0      Absolute Immature Granulocytes 0.2      Absolute NRBCs 0.0     LACTIC ACID WHOLE BLOOD WITH 1X REPEAT IN 2 HR WHEN >2 - Normal    Lactic Acid, Initial 1.4     INFLUENZA A/B, RSV AND SARS-COV2 PCR - Normal    Influenza A PCR Negative      Influenza B PCR Negative      RSV PCR Negative      SARS CoV2 PCR Negative     BLOOD CULTURE   BLOOD CULTURE       Imaging   CT Head w/o Contrast   Final Result   IMPRESSION:   1.  No CT evidence for acute intracranial process.   2.  Brain atrophy and presumed chronic microvascular ischemic changes as above.      CT Cervical Spine w/o Contrast   Final Result   IMPRESSION:       1.  No fracture or posttraumatic subluxation.   2.  No high-grade spinal canal or neural foraminal stenosis.         XR Chest 2 Views   Final Result   IMPRESSION: Evaluation somewhat limited by positioning and body habitus. Lungs and pleural spaces are grossly clear. No pleural effusion or pneumothorax. Normal size cardiomediastinal silhouette. Osteopenia and degenerative changes in the spine.        Independent Interpretation   CT Head: No intracranial hemorrhage.    ED Course      Medications Administered   Medications - No data to display    Procedures   Procedures     Discussion of Management   None    ED Course        Additional Documentation  None    Medical Decision Making / Diagnosis     CMS Diagnoses: None    MIPS       None    MDM   Nallely Manrique is a 89 year old female who presented to the emergency department for evaluation of falls and confusion.  See HPI for further details.  On examination the patient is alert and oriented to self, place, situation.  She is hemodynamically stable and afebrile.  Given falls on anticoagulation, a CT head was performed which thankfully was negative for acute intracranial hemorrhage.  CT cervical  spine additionally obtained which showed no evidence of acute fracture.  Infectious workup pursued.  Lactate within normal limits.  COVID/influenza/RSV swab negative.  Urinalysis without evidence of infection.  Chest x-ray shows no evidence of pneumonia.  Patient does have a leukocytosis of 23.6.  Suspect this may be elevated in the setting of stress response and viral illness.  Her abdomen is soft and nontender, no indication for imaging at this time.  Doubt infectious process in the abdomen.  Blood cultures drawn which are pending.  Patient has had multiple sick contacts with a viral URI, I do suspect that this is likely the cause of her symptoms today.  I discussed this with her son, Cody who expressed understanding regarding the workup.  He is comfortable with her going back to her care facility.  Did discuss that the cultures are pending.  If these were to result positive patient is to return to the emergency department.  Return precautions discussed with the son who expressed understanding.  Plan for the patient to be discharged back to her care facility.    Disposition   The patient was discharged.     Diagnosis     ICD-10-CM    1. Weakness generalized  R53.1       2. Fall at home, initial encounter  W19.XXXA     Y92.009       3. Cough  R05.9          YAN Smith Alexandra, PA-C  01/16/25 1808

## 2025-01-21 LAB
BACTERIA BLD CULT: NO GROWTH
BACTERIA BLD CULT: NO GROWTH

## 2025-01-26 ENCOUNTER — HEALTH MAINTENANCE LETTER (OUTPATIENT)
Age: OVER 89
End: 2025-01-26

## 2025-06-11 ENCOUNTER — HOSPITAL ENCOUNTER (EMERGENCY)
Facility: CLINIC | Age: OVER 89
Discharge: NURSING, REHAB OR LONG-TERM CARE | End: 2025-06-11
Attending: EMERGENCY MEDICINE
Payer: MEDICARE

## 2025-06-11 ENCOUNTER — APPOINTMENT (OUTPATIENT)
Dept: CT IMAGING | Facility: CLINIC | Age: OVER 89
End: 2025-06-11
Attending: EMERGENCY MEDICINE
Payer: MEDICARE

## 2025-06-11 VITALS
DIASTOLIC BLOOD PRESSURE: 68 MMHG | RESPIRATION RATE: 18 BRPM | OXYGEN SATURATION: 97 % | TEMPERATURE: 98.3 F | HEART RATE: 67 BPM | SYSTOLIC BLOOD PRESSURE: 148 MMHG

## 2025-06-11 DIAGNOSIS — S09.90XA CLOSED HEAD INJURY, INITIAL ENCOUNTER: ICD-10-CM

## 2025-06-11 PROCEDURE — 99284 EMERGENCY DEPT VISIT MOD MDM: CPT | Mod: 25

## 2025-06-11 PROCEDURE — 70450 CT HEAD/BRAIN W/O DYE: CPT

## 2025-06-11 ASSESSMENT — ACTIVITIES OF DAILY LIVING (ADL)
ADLS_ACUITY_SCORE: 57

## 2025-06-11 ASSESSMENT — COLUMBIA-SUICIDE SEVERITY RATING SCALE - C-SSRS
6. HAVE YOU EVER DONE ANYTHING, STARTED TO DO ANYTHING, OR PREPARED TO DO ANYTHING TO END YOUR LIFE?: NO
1. IN THE PAST MONTH, HAVE YOU WISHED YOU WERE DEAD OR WISHED YOU COULD GO TO SLEEP AND NOT WAKE UP?: NO
2. HAVE YOU ACTUALLY HAD ANY THOUGHTS OF KILLING YOURSELF IN THE PAST MONTH?: NO

## 2025-06-11 NOTE — ED PROVIDER NOTES
Emergency Department Note      History of Present Illness     Chief Complaint   Fall      HPI   Nallely Manrique is a 89 year old female here for evaluation of a fall. The patient states that she hit the back of her head reaching for her night stand when she fell out of bed. She reports that she is having right hip pain, but notes that she broke her hip and has had pain ever since. She denies having pain in her legs or knees. She notes that she is able to  her arms. No loss of consciousness. No neck pain.     Independent Historian   None    Review of External Notes   Clinic notes    Past Medical History     Medical History and Problem List   Anemia    Medications   Aricept  Ergocalciferol  Lexapro  Neurontin  Culturell  Lopressor   Myrbetriq  Miralax  Xarelto    Physical Exam     Patient Vitals for the past 24 hrs:   BP Temp Temp src Pulse Resp SpO2   06/11/25 1221 (!) 148/68 98.3  F (36.8  C) Oral 67 18 97 %     Physical Exam  GENERAL: well developed, pleasant. Mild hip pain.  HEAD: atraumatic  EYES: pupils reactive, extraocular muscles intact, conjunctivae normal  ENT:  mucus membranes moist  NECK:  trachea midline, normal range of motion  RESPIRATORY: no tachypnea, breath sounds clear to auscultation   CVS: normal S1/S2, no murmurs, intact distal pulses  ABDOMEN: soft, nontender, nondistention  MUSCULOSKELETAL: no deformities  SKIN: warm and dry, no acute rashes or ulceration  NEURO: GCS 15, cranial nerves intact, alert and oriented x3  PSYCH:  Mood/affect normal    Diagnostics     Lab Results   Labs Ordered and Resulted from Time of ED Arrival to Time of ED Departure - No data to display    Imaging   CT Head w/o Contrast   Final Result   IMPRESSION:   1.  No evidence of acute intracranial abnormality.          Independent Interpretation   CT Head: No intracranial hemorrhage.    ED Course      Medications Administered   Medications - No data to display    Procedures   Procedures     Discussion of  Management   None    ED Course   ED Course as of 06/11/25 1623   Wed Jun 11, 2025   1143 I obtained history and examined the patient as noted above.    1320 We discussed plan for discharge and the patient is comfortable with this.        Additional Documentation  None    Medical Decision Making / Diagnosis     CMS Diagnoses: None    MIPS   None        MDM   Nallely Manrique is a 89 year old female presents with mechanical fall on Xarelto.  She has relatively few complaints she has some mild hip pain but notes some chronic hip pain has been up and ambulating does not feel that she needs an x-ray of her hip.  CT head is negative for intracranial hemorrhage.  Patient is reassured and discharged to home.  She did not want any thing for her headache.    Disposition   The patient was discharged.     Diagnosis     ICD-10-CM    1. Closed head injury, initial encounter  S09.90XA            Discharge Medications   Discharge Medication List as of 6/11/2025 12:49 PM            Scribe Disclosure:  I, Bouchra Galan, am serving as a scribe at 11:45 AM on 6/11/2025 to document services personally performed by Jn Cespedes MD based on my observations and the provider's statements to me.        Jn Cespedes MD  06/11/25 1621

## 2025-06-11 NOTE — ED NOTES
Wheelchair ride setup to transport patient back to [facility], ETA within the hour.    Report was attempted to be called to [facility], voicemail was left for RN staff.

## 2025-06-11 NOTE — ED TRIAGE NOTES
Brought in by ambulance from [facility] for a headache. Per EMS patient rolled out of bed last night and hit head on a carpeted floor, was assisted back to bed by staff. Patient continues to complain of head pain, denies neck pain. Patient is on Xarelto.    Per EMS patient normally gets around via wheelchair.